# Patient Record
Sex: FEMALE | Race: WHITE | Employment: UNEMPLOYED | URBAN - METROPOLITAN AREA
[De-identification: names, ages, dates, MRNs, and addresses within clinical notes are randomized per-mention and may not be internally consistent; named-entity substitution may affect disease eponyms.]

---

## 2017-01-10 ENCOUNTER — OFFICE VISIT (OUTPATIENT)
Dept: INTERNAL MEDICINE CLINIC | Age: 52
End: 2017-01-10

## 2017-01-10 VITALS
DIASTOLIC BLOOD PRESSURE: 86 MMHG | TEMPERATURE: 97.7 F | WEIGHT: 162 LBS | HEIGHT: 60 IN | HEART RATE: 122 BPM | SYSTOLIC BLOOD PRESSURE: 122 MMHG | OXYGEN SATURATION: 97 % | BODY MASS INDEX: 31.8 KG/M2

## 2017-01-10 DIAGNOSIS — R35.0 URINARY FREQUENCY: Primary | ICD-10-CM

## 2017-01-10 DIAGNOSIS — R00.0 TACHYCARDIA: ICD-10-CM

## 2017-01-10 DIAGNOSIS — M54.5 ACUTE LEFT-SIDED LOW BACK PAIN, WITH SCIATICA PRESENCE UNSPECIFIED: ICD-10-CM

## 2017-01-10 DIAGNOSIS — R39.15 URGENCY OF URINATION: ICD-10-CM

## 2017-01-10 DIAGNOSIS — M25.552 LEFT HIP PAIN: ICD-10-CM

## 2017-01-10 DIAGNOSIS — F43.0 ACUTE STRESS REACTION: ICD-10-CM

## 2017-01-10 LAB
BILIRUB UR QL STRIP: NEGATIVE
GLUCOSE UR-MCNC: NEGATIVE MG/DL
KETONES P FAST UR STRIP-MCNC: NORMAL MG/DL
PH UR STRIP: 5.5 [PH] (ref 4.6–8)
PROT UR QL STRIP: NEGATIVE MG/DL
SP GR UR STRIP: 1.03 (ref 1–1.03)
UA UROBILINOGEN AMB POC: NORMAL (ref 0.2–1)
URINALYSIS CLARITY POC: CLEAR
URINALYSIS COLOR POC: YELLOW
URINE BLOOD POC: NEGATIVE
URINE LEUKOCYTES POC: NEGATIVE
URINE NITRITES POC: NEGATIVE

## 2017-01-10 NOTE — PROGRESS NOTES
HPI/History  Ashlee Martinez is a 46 y.o.  female who presents for evaluation. Pt reports urinary frequency and urgency starting last night. No dysuria, hematuria, fever, or vaginal sxs. No CVA tenderness but mild low back pain around left SI region and also left trochanteric region; no radiation. Hx of urinary stones but not similar per reports. Tachycardic today. Pt has not noted any palpitations and denies any other cardiopulmonary sxs. Pt significantly stressed and anxious due to a family member's recent health then passing today. She takes 0.5mg xanax infrequently as needed, 1 mg usually too strong per report. Last dose last night. Denies any other sxs, issues, or complaints. Patient Active Problem List   Diagnosis Code    Nephrolithiasis Dr. Nury Watkins N20.0    Depression F32.9    Hypovitaminosis D E55.9    Dyslipidemia E78.5    Hypothyroidism due to acquired atrophy of thyroid E03.4    Obesity (BMI 30-39. 9) E66.9    Colon adenoma 3/16 Dr Tawnya Guerrero D12.6     Past Medical History   Diagnosis Date    Anxiety     Colon adenoma 3/16     Dr Tawnya Guerrero    Depression     Dyslipidemia      intol numerous statins/tricor, FO ineffective    Fatty liver      negative serologies ; Fib-4 was 0.3 from 7/15    FHx: heart disease     GERD (gastroesophageal reflux disease)     Hypertension     Hypothyroidism     Hypovitaminosis D     Migraines      CT head negative     Nephrolithiasis     Neuropathy      on EMG Dr. Sandra Cerrato Obesity      peak weight 163 lbs, bmi 31.3 from 2/15    Seasonal allergies     Tension headache      Past Surgical History   Procedure Laterality Date    Hx total vaginal hysterectomy      Hx tonsillectomy      Vascular surgery procedure unlist       NADEGE 1.29/1.17    Hx  section       x4 due to cephalopelvic disproportion    Hx tubal ligation       bilateral    Pr colsc flx w/rmvl of tumor polyp lesion snare tq       Dr. Tawnya Guerrero 3/16 adenoma     Social History     Social History    Marital status:      Spouse name: N/A    Number of children: 3    Years of education: N/A     Occupational History    housewife      Social History Main Topics    Smoking status: Never Smoker    Smokeless tobacco: Never Used    Alcohol use No    Drug use: No    Sexual activity: Not on file     Other Topics Concern    Not on file     Social History Narrative     Family History   Problem Relation Age of Onset    Hypertension Mother     Diabetes Mother     Elevated Lipids Father     Colon Polyps Father     Hypertension Sister     Diabetes Maternal Grandmother     Stroke Maternal Grandmother     Heart Disease Paternal Grandmother     Hypertension Paternal Grandmother     Heart Disease Paternal Grandfather     Hypertension Paternal Grandfather      Current Outpatient Prescriptions   Medication Sig    colesevelam (WELCHOL) 625 mg tablet Take 3 Tabs by mouth two (2) times daily (with meals).  ALPRAZolam (XANAX) 1 mg tablet Take 1 Tab by mouth three (3) times daily as needed for Anxiety. Max Daily Amount: 3 mg.  chlorpheniramine-HYDROcodone (TUSSIONEX) 10-8 mg/5 mL suspension Take 5 mL by mouth every twelve (12) hours as needed for Cough. Max Daily Amount: 10 mL.  albuterol (PROVENTIL HFA, VENTOLIN HFA, PROAIR HFA) 90 mcg/actuation inhaler Take 2 Puffs by inhalation every six (6) hours as needed for Wheezing.  guaiFENesin-codeine (ROBITUSSIN AC) 100-10 mg/5 mL solution Take 5 mL by mouth three (3) times daily as needed for Cough. Max Daily Amount: 15 mL.  levothyroxine (SYNTHROID) 50 mcg tablet Take 1 Tab by mouth Daily (before breakfast).  citalopram (CELEXA) 20 mg tablet Take 1 Tab by mouth daily.  amLODIPine (NORVASC) 10 mg tablet Take 1 Tab by mouth daily.  gabapentin (NEURONTIN) 600 mg tablet Take 1 Tab by mouth three (3) times daily.     ergocalciferol (VITAMIN D2) 50,000 unit capsule Take 1 Cap by mouth every seven (7) days.  clotrimazole-betamethasone (LOTRISONE) topical cream Apply thin layer to affected area TWICE DAILY AS NEEDED    fluticasone (FLONASE) 50 mcg/actuation nasal spray 2 sprays each nostril daily    cyclobenzaprine (FLEXERIL) 10 mg tablet Take 1 Tab by mouth three (3) times daily as needed for Muscle Spasm(s).  omeprazole (PRILOSEC) 20 mg capsule Take 20 mg by mouth daily. No current facility-administered medications for this visit. Allergies   Allergen Reactions    Crestor [Rosuvastatin] Other (comments)     aches    Lipitor [Atorvastatin] Other (comments)     aches    Mobic [Meloxicam] Nausea Only and Other (comments)     headache    Niaspan [Niacin] Other (comments)     flushing    Percocet [Oxycodone-Acetaminophen] Other (comments)    Pravachol [Pravastatin] Other (comments)     aches    Tricor [Fenofibrate Micronized] Other (comments)     Elevated LFTs    Zetia [Ezetimibe] Other (comments)     gassiness    Zocor [Simvastatin] Other (comments)     aches       Review of Systems  Significant findings included in HPI. Physical Examination  Visit Vitals    /86    Pulse (!) 122    Temp 97.7 °F (36.5 °C) (Oral)    Ht 5' (1.524 m)    Wt 162 lb (73.5 kg)    SpO2 97%    BMI 31.64 kg/m2   HR recheck by palpation 119. Results for orders placed or performed in visit on 01/10/17   AMB POC URINALYSIS DIP STICK MANUAL W/O MICRO   Result Value Ref Range    Color (UA POC) Yellow     Clarity (UA POC) Clear     Glucose (UA POC) Negative Negative    Bilirubin (UA POC) Negative Negative    Ketones (UA POC) Trace Negative    Specific gravity (UA POC) 1.030 1.001 - 1.035    Blood (UA POC) Negative Negative    pH (UA POC) 5.5 4.6 - 8.0    Protein (UA POC) Negative Negative mg/dL    Urobilinogen (UA POC) 0.2 mg/dL 0.2 - 1    Nitrites (UA POC) Negative Negative    Leukocyte esterase (UA POC) Negative Negative     General - Alert and in no acute distress.  Pt appears well, comfortable, and in good spirits. Nontoxic. Very pleasant and engaging, not overly anxious appearing currently. Non-diaphoretic. Mental status - Appropriate mood, behavior, speech content, dress, and thought processes. Pulm - No tachypnea, retractions, or cyanosis. Good respiratory effort. Clear to auscultation bilat. Cardiovascular - Tachycardic, regular rhythm. No appreciable murmurs or gallops. Back - No CVA tenderness bilat. Minimal tenderness in the left SI region and left trochanteric region. No visible abnormalities. Normal ROM and ambulation. No other findings. Assessment and Plan  1. Urinary frequency and urgency since last night - UA negative. Discussed potential causes and contributing factors. Monitor for now but increase fluids and return if any developments. 2. Left SI and left trochanteric discomfort appears to be musculoskeletal in nature (with bursal involvement). Conservative measures and analgesics as needed and discussed at length along with course and prognosis. 3. Tachycardia today likely related to acute stress and anxiety from family member's recent health then passing today. No other cardiopulmonary sxs. She will continue xanax as needed and revisited adverse effects. She will return if any developments or visit ED if cardiopulmonary sxs. Pt happily agrees with plan. PLEASE NOTE:   This document has been produced using voice recognition software. Unrecognized errors in transcription may be present.     Pepper Delgado [facility]  [phone]  1/10/2017

## 2017-01-10 NOTE — MR AVS SNAPSHOT
Visit Information Date & Time Provider Department Dept. Phone Encounter #  
 1/10/2017  3:30 PM Ute Aguilar Internist of [address] [id number] Your Appointments 1/20/2017  8:10 AM  
LAB with [facility] NURSE VISIT Internist of [facility] ([facility]) Appt Note: lab  
 [address], [address] [facility] [address] [place]  
  
   
 [address], [address]  
  
    
 1/27/2017  9:00 AM  
Office Visit with Haleigh Smiley MD  
Internist of [address] [place] [address] [facility]: [address]; [address]  
 [address], [address] Maureen Limb [address] [place]  
  
   
 [address], [address] Upcoming Health Maintenance Date Due INFLUENZA AGE 9 TO ADULT 8/1/2016 COLONOSCOPY 3/16/2017 BREAST CANCER SCRN MAMMOGRAM 11/7/2018 DTaP/Tdap/Td series (2 - Td) 8/8/2022 Allergies as of 1/10/2017  Review Complete On: 11/7/2016 By: Shelley Bobo Severity Noted Reaction Type Reactions Crestor [Rosuvastatin]    Other (comments)  
 aches Lipitor [Atorvastatin]    Other (comments)  
 aches Mobic [Meloxicam]    Nausea Only, Other (comments)  
 headache Niaspan [Niacin]    Other (comments)  
 flushing Percocet [Oxycodone-acetaminophen]  09/13/2013    Other (comments) Pravachol [Pravastatin]    Other (comments)  
 aches Tricor [Fenofibrate Micronized]    Other (comments) Elevated LFTs Zetia [Ezetimibe]  09/13/2013    Other (comments)  
 gassiness Zocor [Simvastatin]    Other (comments)  
 aches Current Immunizations  Reviewed on 10/19/2012 Name Date Influenza Vaccine (Quad) PF 10/2/2015 10:37 AM  
 Influenza Vaccine PF 2/27/2015 Influenza Vaccine Split 10/19/2012, 10/21/2011  2:06 PM  
 Influenza Vaccine Whole 10/15/2010 TD Vaccine 4/15/2011 TDAP Vaccine 8/8/2012 Not reviewed this visit You Were Diagnosed With   
  
 Codes Comments Urgency of urination    -  Primary ICD-10-CM: R39.15 ICD-9-CM: 597.11 Abdominal pressure     ICD-10-CM: R10.9 ICD-9-CM: 789.00 Vitals BP Pulse Temp Height(growth percentile) Weight(growth percentile) SpO2  
 122/86 (!) 122 97.7 °F (36.5 °C) (Oral) 5' (1.524 m) 162 lb (73.5 kg) 97% BMI OB Status Smoking Status 31.64 kg/m2 Hysterectomy Never Smoker Vitals History BMI and BSA Data Body Mass Index Body Surface Area  
 31.64 kg/m 2 1.76 m 2 Preferred Pharmacy Pharmacy Name Phone Ira[facility] Armendariz, [facility] Your Updated Medication List  
  
   
This list is accurate as of: 1/10/17  3:30 PM.  Always use your most recent med list.  
  
  
  
  
 albuterol 90 mcg/actuation inhaler Commonly known as:  PROVENTIL HFA, VENTOLIN HFA, PROAIR HFA Take 2 Puffs by inhalation every six (6) hours as needed for Wheezing. ALPRAZolam 1 mg tablet Commonly known as:  Reno Les Take 1 Tab by mouth three (3) times daily as needed for Anxiety. Max Daily Amount: 3 mg. amLODIPine 10 mg tablet Commonly known as:  Oneal Bashir Take 1 Tab by mouth daily. chlorpheniramine-HYDROcodone 10-8 mg/5 mL suspension Commonly known as:  Judy Elias Take 5 mL by mouth every twelve (12) hours as needed for Cough. Max Daily Amount: 10 mL. citalopram 20 mg tablet Commonly known as:  Ilya Diamante Take 1 Tab by mouth daily. clotrimazole-betamethasone topical cream  
Commonly known as:  Wilver Drones Apply thin layer to affected area TWICE DAILY AS NEEDED  
  
 colesevelam 625 mg tablet Commonly known as:  [facility] Take 3 Tabs by mouth two (2) times daily (with meals). cyclobenzaprine 10 mg tablet Commonly known as:  FLEXERIL Take 1 Tab by mouth three (3) times daily as needed for Muscle Spasm(s). ergocalciferol 50,000 unit capsule Commonly known as:  VITAMIN D2 Take 1 Cap by mouth every seven (7) days. fluticasone 50 mcg/actuation nasal spray Commonly known as:  FLONASE  
2 sprays each nostril daily  
  
 gabapentin 600 mg tablet Commonly known as:  NEURONTIN Take 1 Tab by mouth three (3) times daily. guaiFENesin-codeine 100-10 mg/5 mL solution Commonly known as:  ROBITUSSIN AC Take 5 mL by mouth three (3) times daily as needed for Cough. Max Daily Amount: 15 mL. levothyroxine 50 mcg tablet Commonly known as:  SYNTHROID Take 1 Tab by mouth Daily (before breakfast). PriLOSEC 20 mg capsule Generic drug:  omeprazole Take 20 mg by mouth daily. We Performed the Following AMB POC URINALYSIS DIP STICK MANUAL W/O MICRO [50734 CPT(R)] Introducing hospitals & [facility] SERVICES! Dear Ld Albrecht: Thank you for requesting a [employer] account. Our records indicate that you already have an active [employer] account. You can access your account anytime at [email or web address] Did you know that you can access your hospital and ER discharge instructions at any time in [employer]? You can also review all of your test results from your hospital stay or ER visit. Additional Information If you have questions, please visit the Frequently Asked Questions section of the [employer] website at [email or web address] Remember, [employer] is NOT to be used for urgent needs. For medical emergencies, dial 911. Now available from your iPhone and Android! Please provide this summary of care documentation to your next provider. Your primary care clinician is listed as Alexandra Bill. If you have any questions after today's visit, please call [phone].

## 2017-01-20 ENCOUNTER — HOSPITAL ENCOUNTER (OUTPATIENT)
Dept: LAB | Age: 52
Discharge: HOME | End: 2017-01-20

## 2017-01-20 PROCEDURE — 99001 SPECIMEN HANDLING PT-LAB: CPT | Performed by: INTERNAL MEDICINE

## 2017-01-22 DIAGNOSIS — E78.5 DYSLIPIDEMIA: ICD-10-CM

## 2017-01-24 NOTE — PROGRESS NOTES
46 y.o. white female who presents for evaluation. Non cardiovascular complaints. She could exercise more.   Weight is stable as below     Vitals 2017 1/10/2017 2016 3/16/2016 3/16/2016 3/16/2016   Weight 159 lb 162 lb 158 lb        Vitals 3/16/2016 3/16/2016 3/11/2016 2016 2016   Weight   160 lb 162 lb 161 lb     Remains on the welchol but she stopped the mevacor due to muscle issues    No gi or gu complaints    Past Medical History   Diagnosis Date    Anxiety     Colon adenoma 3/16     Dr Khadra Cedeño    Depression     Dyslipidemia      intol 5 statins/tricor    Fatty liver      negative serologies ; Fib-4 was 0.3 from 7/15    FHx: heart disease     GERD (gastroesophageal reflux disease)     Hypertension     Hypothyroidism     Hypovitaminosis D     Migraines      CT head negative     Nephrolithiasis     Neuropathy 2006     on EMG Dr. Buddy Henley Obesity      peak weight 163 lbs, bmi 31.3 from 2/15; dec johnathan suppressants, med supervised wt loss    Seasonal allergies     Tension headache      Past Surgical History   Procedure Laterality Date    Hx total vaginal hysterectomy      Hx tonsillectomy      Vascular surgery procedure unlist       NADEGE 1.29/1.17    Hx  section       x4 due to cephalopelvic disproportion    Hx tubal ligation       bilateral    Pr colsc flx w/rmvl of tumor polyp lesion snare tq       Dr. Khadra Cedeño 3/16 adenoma     Social History     Social History    Marital status:      Spouse name: N/A    Number of children: 4    Years of education: N/A     Occupational History    housewife      Social History Main Topics    Smoking status: Never Smoker    Smokeless tobacco: Never Used    Alcohol use No    Drug use: No    Sexual activity: Not on file     Other Topics Concern    Not on file     Social History Narrative     Current Outpatient Prescriptions   Medication Sig    colesevelam (WELCHOL) 625 mg tablet Take 3 Tabs by mouth two (2) times daily (with meals).  ALPRAZolam (XANAX) 1 mg tablet Take 1 Tab by mouth three (3) times daily as needed for Anxiety. Max Daily Amount: 3 mg.  albuterol (PROVENTIL HFA, VENTOLIN HFA, PROAIR HFA) 90 mcg/actuation inhaler Take 2 Puffs by inhalation every six (6) hours as needed for Wheezing.  levothyroxine (SYNTHROID) 50 mcg tablet Take 1 Tab by mouth Daily (before breakfast).  citalopram (CELEXA) 20 mg tablet Take 1 Tab by mouth daily.  amLODIPine (NORVASC) 10 mg tablet Take 1 Tab by mouth daily.  gabapentin (NEURONTIN) 600 mg tablet Take 1 Tab by mouth three (3) times daily.  clotrimazole-betamethasone (LOTRISONE) topical cream Apply thin layer to affected area TWICE DAILY AS NEEDED    fluticasone (FLONASE) 50 mcg/actuation nasal spray 2 sprays each nostril daily    cyclobenzaprine (FLEXERIL) 10 mg tablet Take 1 Tab by mouth three (3) times daily as needed for Muscle Spasm(s).  omeprazole (PRILOSEC) 20 mg capsule Take 20 mg by mouth daily.  ergocalciferol (VITAMIN D2) 50,000 unit capsule Take 1 Cap by mouth every seven (7) days. No current facility-administered medications for this visit.       Allergies   Allergen Reactions    Crestor [Rosuvastatin] Myalgia    Lipitor [Atorvastatin] Myalgia    Mevacor [Lovastatin] Myalgia    Mobic [Meloxicam] Nausea Only and Other (comments)     headache    Niaspan [Niacin] Other (comments)     flushing    Percocet [Oxycodone-Acetaminophen] Other (comments)    Pravachol [Pravastatin] Myalgia    Tricor [Fenofibrate Micronized] Other (comments)     Elevated LFTs    Zetia [Ezetimibe] Other (comments)     gassiness    Zocor [Simvastatin] Myalgia     REVIEW OF SYSTEMS: mammo 10/15, Dr Frost Eth 9/15, colo 3/16 Dr Jonah Joe   Ophtho - no vision change or eye pain  Oral - no mouth pain, tongue or tooth problems  Ears - no hearing loss, ear pain, fullness, no swallowing problems  Cardiac - no CP, PND, orthopnea, edema, palpitations or syncope  Chest - no breast masses  Resp - no wheezing, chronic coughing, dyspnea  GI - no heartburn, nausea, vomiting, change in bowel habits, bleeding, hemorrhoids  Urinary - no dysuria, hematuria, flank pain, urgency, frequency  Neuro - no focal weakness, numbness, paresthesias, incoordination, ataxia, involuntary movements  Endo - no polyuria, polydipsia, nocturia, hot flashes    Visit Vitals    /80    Pulse 89    Temp 98.3 °F (36.8 °C) (Oral)    Ht 5' (1.524 m)    Wt 159 lb (72.1 kg)    SpO2 96%    BMI 31.05 kg/m2   A&O x3  Affect is appropriate. Mood stable  No apparent distress  Anicteric, no JVD, adenopathy or thyromegaly. No carotid bruits or radiated murmur  Lungs showed crackles at the left base, dec bs but no clear wheezing  Heart showed regular rate and rhythm. No murmur, rubs, gallops  Abdomen soft nontender, no hepatosplenomegaly or masses. Extremities without edema.   Pulses 1-2+ symmetrically    LABS  From 10/11 showed ldl-p 2955  chol 199, tg 390, hdl 39, ldl-c 82  From  2/12  showed ldl-p 2029, chol 223, tg 244, hdl 38, ldl-c 136,  gluc 90, cr 0.70,      alt 33  From  6/12  showed ldl-p 2427, chol 221, tg 196, hdl 45, ldl-c 137  From 10/12 showed ldl-p 2056, chol 193, tg 261, hdl 37, ldl-c 104,       tsh 1.56  From 2/13 showed       chol 164, tg 190, hdl 44, ldl-c 113  From 9/13 showed       chol 201, tg 195, hdl 38, ldl-c 124, gluc 89, cr 0.70, gfr 104, alt 18, tsh 1.48,     vit d 35.1  From 3/14 showed       chol 213, tg 269, hdl 40, ldl-c 119, gluc 96, cr 0.63, gfr>60,  alt 17  From 7/14 showed       chol 216, tg 237, hdl 38, ldl-c 131, gluc 87, cr 0.60, gfr>60,  alt 10, tsh 1.29  From 1/15 showed       chol 210, tg 305, hdl 40, ldl-c 109, gluc 89, cr 0.62, gfr>60,  alt 11  From 7/15 showed       chol 209, tg 231, hdl 45, ldl-c 118, gluc 89, cr 0.70, gfr>60,  alt 15, tsh 3.23, wbc 6.7, hb 14.6, plt 294  From 1/16 showed       chol 212, tg 314, hdl 40, ldl-c 109, hep c neg  From 7/16 showed       chol 218, tg 253, hdl 37, ldl-c 130, gluc 88, cr 0.61, gfr 106, alt 27, tsh 2.71  From 1/17 showed       chol 218, tg 229, hdl 46, ldl-c 126,            wbc 6.8, hb 16.5, plt 321, vit d 21.1    Patient Active Problem List   Diagnosis Code    Nephrolithiasis Dr. Sangita Frank N20.0    Depression F32.9    Hypovitaminosis D E55.9    Dyslipidemia E78.5    Hypothyroidism due to acquired atrophy of thyroid E03.4    Obesity (BMI 30-39. 9) E66.9    Colon adenoma 3/16 Dr Senaida Kawasaki D12.6     Assessment and plan:  1. HLP. Intol 5 statins. Not much response to the welchol upon review of numbers above. Discussed pitavastain and new pcsk9 although doubt she'll be qualified for the latter. We decided to stop the welchol and reeval in 6 mos  2. Hypovit d. Restart supplementation  3. HTN. Continue current  4. Hypothyroidism. Therapeutic   5. Overweight/obese. Dietary and lifestyle measures, calorie counting. Previously not interested in johnathan suppressants. We discussed lack of data and success w otc johnathan supp. DIscussed incorporating more toning into her regime  6. Anxiety. Prn xanax   7. Colon adenoma. Fiber, colo 2021          RTC 7/17      Above conditions discussed at length and patient vocalized understanding.   All questions answered to patient satifaction

## 2017-01-27 ENCOUNTER — OFFICE VISIT (OUTPATIENT)
Dept: INTERNAL MEDICINE CLINIC | Age: 52
End: 2017-01-27

## 2017-01-27 VITALS
SYSTOLIC BLOOD PRESSURE: 120 MMHG | HEIGHT: 60 IN | WEIGHT: 159 LBS | HEART RATE: 89 BPM | DIASTOLIC BLOOD PRESSURE: 80 MMHG | OXYGEN SATURATION: 96 % | TEMPERATURE: 98.3 F | BODY MASS INDEX: 31.22 KG/M2

## 2017-01-27 DIAGNOSIS — E55.9 HYPOVITAMINOSIS D: ICD-10-CM

## 2017-01-27 DIAGNOSIS — E03.4 HYPOTHYROIDISM DUE TO ACQUIRED ATROPHY OF THYROID: Primary | ICD-10-CM

## 2017-01-27 DIAGNOSIS — E78.5 DYSLIPIDEMIA: ICD-10-CM

## 2017-01-27 DIAGNOSIS — E66.9 OBESITY (BMI 30-39.9): ICD-10-CM

## 2017-01-27 RX ORDER — ERGOCALCIFEROL 1.25 MG/1
50000 CAPSULE ORAL
Qty: 13 CAP | Refills: 3 | Status: SHIPPED | OUTPATIENT
Start: 2017-01-27 | End: 2018-01-08 | Stop reason: SDUPTHER

## 2017-01-27 NOTE — PROGRESS NOTES
1. Have you been to the ER, urgent care clinic or hospitalized since your last visit? NO.     2. Have you seen or consulted any other health care providers outside of the [address] [place] [address] since your last visit (Include any pap smears or colon screening)? NO      Do you have an Advanced Directive? NO    Would you like information on Advanced Directives?  YES

## 2017-01-27 NOTE — MR AVS SNAPSHOT
Visit Information Date & Time Provider Department Dept. Phone Encounter #  
 1/27/2017  9:00 AM Christ Irizarry MD Internist of [place] [place] [phone] Your Appointments 7/13/2017  9:15 AM  
LAB with [facility] NURSE VISIT Internist of [facility] Appt Note: labs 6mos. rd  
 [address], [address] [facility] [address]  
  
   
 [address], [address]  
  
    
 7/27/2017  8:15 AM  
Office Visit with Christ Irizarry MD  
Internist of [facility]) Appt Note: ov 6mos. rd  
 [address], [address] [facility] ([place], [place]) [address]  
  
   
 [address], [address] Upcoming Health Maintenance Date Due  
 BREAST CANCER SCRN MAMMOGRAM 11/7/2018 COLONOSCOPY 3/16/2021 DTaP/Tdap/Td series (2 - Td) 8/8/2022 Allergies as of 1/27/2017  Review Complete On: 1/27/2017 By: Christ Irizarry MD  
  
 Severity Noted Reaction Type Reactions Crestor [Rosuvastatin]    Myalgia Lipitor [Atorvastatin]    Myalgia Mevacor [Lovastatin]  01/27/2017    Myalgia Mobic [Meloxicam]    Nausea Only, Other (comments)  
 headache Niaspan [Niacin]    Other (comments)  
 flushing Percocet [Oxycodone-acetaminophen]  09/13/2013    Other (comments) Pravachol [Pravastatin]    Myalgia Tricor [Fenofibrate Micronized]    Other (comments) Elevated LFTs Zetia [Ezetimibe]  09/13/2013    Other (comments)  
 gassiness Zocor [Simvastatin]    Myalgia Current Immunizations  Reviewed on 10/19/2012 Name Date Influenza Vaccine (Quad) PF 10/2/2015 10:37 AM  
 Influenza Vaccine PF 2/27/2015 Influenza Vaccine Split 10/19/2012, 10/21/2011  2:06 PM  
 Influenza Vaccine Whole 10/15/2010 TD Vaccine 4/15/2011 TDAP Vaccine 8/8/2012 Not reviewed this visit Vitals BP Pulse Temp Height(growth percentile) Weight(growth percentile) SpO2  
 120/80 89 98.3 °F (36.8 °C) (Oral) 5' (1.524 m) 159 lb (72.1 kg) 96% BMI OB Status Smoking Status 31.05 kg/m2 Hysterectomy Never Smoker Vitals History BMI and BSA Data Body Mass Index Body Surface Area 31.05 kg/m 2 1.75 m 2 Preferred Pharmacy Pharmacy Name Phone [facility] Your Updated Medication List  
  
   
This list is accurate as of: 1/27/17  9:32 AM.  Always use your most recent med list.  
  
  
  
  
 albuterol 90 mcg/actuation inhaler Commonly known as:  PROVENTIL HFA, VENTOLIN HFA, PROAIR HFA Take 2 Puffs by inhalation every six (6) hours as needed for Wheezing. ALPRAZolam 1 mg tablet Commonly known as:  [place] Amend Take 1 Tab by mouth three (3) times daily as needed for Anxiety. Max Daily Amount: 3 mg. amLODIPine 10 mg tablet Commonly known as:  Brandy Croon Take 1 Tab by mouth daily. citalopram 20 mg tablet Commonly known as:  Ej Howellh Take 1 Tab by mouth daily. clotrimazole-betamethasone topical cream  
Commonly known as:  Creolayaakov Remy Apply thin layer to affected area TWICE DAILY AS NEEDED  
  
 colesevelam 625 mg tablet Commonly known as:  [facility] Take 3 Tabs by mouth two (2) times daily (with meals). cyclobenzaprine 10 mg tablet Commonly known as:  FLEXERIL Take 1 Tab by mouth three (3) times daily as needed for Muscle Spasm(s). ergocalciferol 50,000 unit capsule Commonly known as:  VITAMIN D2 Take 1 Cap by mouth every seven (7) days. fluticasone 50 mcg/actuation nasal spray Commonly known as:  FLONASE  
2 sprays each nostril daily  
  
 gabapentin 600 mg tablet Commonly known as:  NEURONTIN Take 1 Tab by mouth three (3) times daily. levothyroxine 50 mcg tablet Commonly known as:  SYNTHROID Take 1 Tab by mouth Daily (before breakfast). PriLOSEC 20 mg capsule Generic drug:  omeprazole Take 20 mg by mouth daily. Introducing [facility] SERVICES! Dear Nikunj Slater: Thank you for requesting a [employer] account. Our records indicate that you already have an active [employer] account. You can access your account anytime at [email or web address]. [email or web address] Did you know that you can access your hospital and ER discharge instructions at any time in [employer]? You can also review all of your test results from your hospital stay or ER visit. Additional Information If you have questions, please visit the Frequently Asked Questions section of the [employer] website at [email or web address] Remember, [employer] is NOT to be used for urgent needs. For medical emergencies, dial 911. Now available from your iPhone and Android! Please provide this summary of care documentation to your next provider. Your primary care clinician is listed as Leila Meredith. If you have any questions after today's visit, please call [phone].

## 2017-01-30 ENCOUNTER — TELEPHONE (OUTPATIENT)
Dept: INTERNAL MEDICINE CLINIC | Age: 52
End: 2017-01-30

## 2017-01-30 NOTE — TELEPHONE ENCOUNTER
Pt calling, says she got rx for Vitamin D. Says she thought she was told to take every other week but bottle when she picked it up says weekly. What is correct dosage?

## 2017-02-08 RX ORDER — AMLODIPINE BESYLATE 10 MG/1
10 TABLET ORAL DAILY
Qty: 30 TAB | Refills: 11 | Status: SHIPPED | OUTPATIENT
Start: 2017-02-08 | End: 2017-04-04 | Stop reason: SDUPTHER

## 2017-03-02 RX ORDER — GABAPENTIN 600 MG/1
600 TABLET ORAL 3 TIMES DAILY
Qty: 90 TAB | Refills: 11 | Status: SHIPPED | OUTPATIENT
Start: 2017-03-02 | End: 2018-03-23 | Stop reason: SDUPTHER

## 2017-03-28 RX ORDER — CITALOPRAM 20 MG/1
20 TABLET, FILM COATED ORAL DAILY
Qty: 90 TAB | Refills: 3 | Status: SHIPPED | OUTPATIENT
Start: 2017-03-28 | End: 2018-03-21 | Stop reason: SDUPTHER

## 2017-04-04 ENCOUNTER — TELEPHONE (OUTPATIENT)
Dept: INTERNAL MEDICINE CLINIC | Age: 52
End: 2017-04-04

## 2017-04-04 RX ORDER — AMLODIPINE BESYLATE 10 MG/1
10 TABLET ORAL DAILY
Qty: 30 TAB | Refills: 11 | Status: SHIPPED | OUTPATIENT
Start: 2017-04-04 | End: 2018-03-04 | Stop reason: SDUPTHER

## 2017-04-04 RX ORDER — LEVOTHYROXINE SODIUM 50 UG/1
TABLET ORAL
Qty: 30 TAB | Refills: 0 | Status: SHIPPED | OUTPATIENT
Start: 2017-04-04 | End: 2017-04-12 | Stop reason: SDUPTHER

## 2017-04-04 RX ORDER — LEVOTHYROXINE SODIUM 50 UG/1
50 TABLET ORAL
Qty: 30 TAB | Refills: 11 | Status: SHIPPED | OUTPATIENT
Start: 2017-04-04 | End: 2018-04-03 | Stop reason: SDUPTHER

## 2017-04-04 NOTE — TELEPHONE ENCOUNTER
[facility]. Got 2 requests for Synthroid wants to know which is correct? With or without refills?

## 2017-04-12 ENCOUNTER — TELEPHONE (OUTPATIENT)
Dept: INTERNAL MEDICINE CLINIC | Age: 52
End: 2017-04-12

## 2017-04-12 NOTE — PROGRESS NOTES
Colon Screen    Patient was contacted by phone for the documentation reflected in this encounter    Patient: Murphy Jackson MRN: [id number]  SSN: [id number]    YOB: 1965  Age: 46 y.o. Sex: female        Subjective:   Murphy Jackson wasreferred due to colonoscopy screening follow up recommendation. PCP is Suleiman Baxter MD.  Patient referred for colonoscopy for   Personal history of colon polyps (screening only), Screening colonoscopy. Patient denies abdominal pain,rectal pain or bleeding. Abdominal surgeries as described below, specifically hysterectomy  Family history as described below, specifically none for colon cancer father hx polyps. Last colonoscopy was 1 year ago with Dr. Yuan Gruber personal hx tubular adenoma. Is patient currently on Oxygen: no  Is patient taking blood thinners, fluid pills,or medication for diabetes? no       Does the patient have a history of any condition listed below? Cardiac, pulmonary or hepatic disease? no  Severe coronary artery disease or aortic stenosis? no  Throat cancer? no  Heart transplant? no  Endocarditis? no  Heart valve replacement? no  Congestive heart failure?  no        Allergies   Allergen Reactions    Crestor [Rosuvastatin] Myalgia    Lipitor [Atorvastatin] Myalgia    Mevacor [Lovastatin] Myalgia    Mobic [Meloxicam] Nausea Only and Other (comments)     headache    Niaspan [Niacin] Other (comments)     flushing    Percocet [Oxycodone-Acetaminophen] Other (comments)    Pravachol [Pravastatin] Myalgia    Tricor [Fenofibrate Micronized] Other (comments)     Elevated LFTs    Zetia [Ezetimibe] Other (comments)     gassiness    Zocor [Simvastatin] Myalgia       Past Medical History:   Diagnosis Date    Anxiety     Colon adenoma 3/16    Dr Yuan Gruber    Depression     Dyslipidemia     intol 5 statins/tricor    Fatty liver     negative serologies 2004; Fib-4 was 0.3 from 7/15    FHx: heart disease     GERD (gastroesophageal reflux disease)     Hypertension     Hypothyroidism     Hypovitaminosis D     Migraines     CT head negative     Nephrolithiasis     Neuropathy 2006    on [facility] Dr. Pedro Nicholson Obesity     peak weight 163 lbs, bmi 31.3 from 2/15; dec johnathan suppressants, med supervised wt loss    Seasonal allergies     Tension headache      Past Surgical History:   Procedure Laterality Date    HX  SECTION      x4 due to cephalopelvic disproportion    HX ORTHOPAEDIC      left foot    HX TONSILLECTOMY      HX TOTAL VAGINAL HYSTERECTOMY      HX TUBAL LIGATION      bilateral    OH COLSC FLX W/RMVL OF TUMOR Roslynn Pond Dr. Santiago Favre 3/16 adenoma    VASCULAR SURGERY PROCEDURE UNLIST      NADEGE 1.29/1.17      Family History   Problem Relation Age of Onset    Hypertension Mother     Diabetes Mother    [de-identified] Elevated Lipids Father     Colon Polyps Father     Hypertension Sister     Diabetes Maternal Grandmother     Stroke Maternal Grandmother     Heart Disease Paternal Grandmother     Hypertension Paternal Grandmother     Heart Disease Paternal Grandfather     Hypertension Paternal Grandfather      Social History   Substance Use Topics    Smoking status: Never Smoker    Smokeless tobacco: Never Used    Alcohol use No      Prior to Admission medications    Medication Sig Start Date End Date Taking? Authorizing Provider   levothyroxine (SYNTHROID) 50 mcg tablet Take 1 Tab by mouth Daily (before breakfast). 17  Yes Malina Jaimes MD   amLODIPine (NORVASC) 10 mg tablet Take 1 Tab by mouth daily. 17  Yes Malina Jaimes MD   citalopram (CELEXA) 20 mg tablet Take 1 Tab by mouth daily. 3/28/17  Yes Malina Jaimes MD   gabapentin (NEURONTIN) 600 mg tablet Take 1 Tab by mouth three (3) times daily. 3/2/17  Yes Malina Jaimes MD   ergocalciferol (VITAMIN D2) 50,000 unit capsule Take 1 Cap by mouth every seven (7) days.  17  Yes Malina Jaimes MD   ALPRAZolam [place] ) 1 mg tablet Take 1 Tab by mouth three (3) times daily as needed for Anxiety. Max Daily Amount: 3 mg. 8/8/16  Yes Edin Gamez MD   clotrimazole-betamethasone (LOTRISONE) topical cream Apply thin layer to affected area TWICE DAILY AS NEEDED 9/11/15  Yes Edin Gamez MD   fluticasone (FLONASE) 50 mcg/actuation nasal spray 2 sprays each nostril daily 8/19/15  Yes Edin Gamez MD   cyclobenzaprine (FLEXERIL) 10 mg tablet Take 1 Tab by mouth three (3) times daily as needed for Muscle Spasm(s). 2/27/15  Yes Edin Gamez MD   omeprazole (PRILOSEC) 20 mg capsule Take 20 mg by mouth daily. Yes Historical Provider   albuterol (PROVENTIL HFA, VENTOLIN HFA, PROAIR HFA) 90 mcg/actuation inhaler Take 2 Puffs by inhalation every six (6) hours as needed for Wheezing. 7/25/16   Edin Gamez MD          Review of Systems   Constitutional: Negative. HENT: Negative for congestion, ear discharge, ear pain, hearing loss, nosebleeds, sore throat and tinnitus. Eyes: Negative. Respiratory: Negative. Negative for stridor. Gastrointestinal: Positive for heartburn. Negative for abdominal pain, blood in stool, constipation, diarrhea, melena, nausea and vomiting. Genitourinary: Negative. Musculoskeletal: Negative. Skin: Positive for itching and rash. Neurological: Positive for headaches. Negative for dizziness, tingling, tremors, sensory change, speech change, focal weakness, seizures and loss of consciousness. Endo/Heme/Allergies: Negative. Psychiatric/Behavioral: Positive for depression. Negative for hallucinations, memory loss, substance abuse and suicidal ideas. The patient does not have insomnia.             Risks colonoscopy described- colon injury, missed lesion, anesthesia problems, bleeding       Cornelio Chairez RN  April 12, 2017  2:04 PM

## 2017-04-12 NOTE — TELEPHONE ENCOUNTER
Patient has been taking Vit D weekly and she has run out but the pharmacy wont refill till the 27th April is it ok to miss it until then. Please review and advise.  [phone]

## 2017-05-10 DIAGNOSIS — F41.9 ANXIETY: ICD-10-CM

## 2017-05-10 RX ORDER — ALPRAZOLAM 1 MG/1
1 TABLET ORAL
Qty: 40 TAB | Refills: 1 | OUTPATIENT
Start: 2017-05-10 | End: 2019-01-31 | Stop reason: SDUPTHER

## 2017-07-13 ENCOUNTER — HOSPITAL ENCOUNTER (OUTPATIENT)
Dept: LAB | Age: 52
Discharge: HOME | End: 2017-07-13

## 2017-07-13 PROCEDURE — 99001 SPECIMEN HANDLING PT-LAB: CPT | Performed by: INTERNAL MEDICINE

## 2017-07-14 LAB
25(OH)D3+25(OH)D2 SERPL-MCNC: 40.2 NG/ML (ref 30–100)
CHOLEST SERPL-MCNC: 242 MG/DL (ref 100–199)
HDLC SERPL-MCNC: 45 MG/DL
INTERPRETATION, 910389: NORMAL
LDLC SERPL CALC-MCNC: 160 MG/DL (ref 0–99)
TRIGL SERPL-MCNC: 184 MG/DL (ref 0–149)
TSH SERPL DL<=0.005 MIU/L-ACNC: 2.04 UIU/ML (ref 0.45–4.5)
VLDLC SERPL CALC-MCNC: 37 MG/DL (ref 5–40)

## 2017-07-23 NOTE — PROGRESS NOTES
46 y.o. white female who presents for evaluation. Non cardiovascular complaints. She could exercise more, sticking to the diet for the most part. Weight is stable as below     Vitals 2017 2017 1/10/2017 2016   Weight 161 lb 6.4 oz 159 lb 162 lb 158 lb     She came off the welchol after the last visit    No gu complaints. She has been having postprandial gi upset from some weeks now, no actual pain, nausea, she does have looser stools and is concerned about her gb    She did something to her right medial knee. Started after she instituted a walking regimen, intermittently swells.  Not using anything for it on regular basis    Past Medical History:   Diagnosis Date    Anxiety     Colon adenoma 3/16    Dr Karen Grigsby    Depression     Dyslipidemia     intol 5 statins/tricor    Fatty liver     negative serologies ; Fib-4 was 0.3 from 7/15    FHx: heart disease     GERD (gastroesophageal reflux disease)     Hypertension     Hypothyroidism     Hypovitaminosis D     Migraines     CT head negative     Nephrolithiasis     Neuropathy ([address]     on [facility] Dr. Silverio Torres Obesity     peak weight 163 lbs, bmi 31.3 from 2/15; dec johnathan suppressants, med supervised wt loss    Seasonal allergies     Tension headache      Past Surgical History:   Procedure Laterality Date    HX  SECTION      x4 due to cephalopelvic disproportion    HX ORTHOPAEDIC      left foot    HX TONSILLECTOMY      HX TOTAL VAGINAL HYSTERECTOMY      HX TUBAL LIGATION      bilateral    ID COLSC FLX W/RMVL OF TUMOR Rob Linear      Dr. Karen Grigsby 3/16 adenoma    VASCULAR SURGERY PROCEDURE UNLIST      NADEGE 1.29/1.17     Social History     Social History    Marital status:      Spouse name: N/A    Number of children: 4    Years of education: N/A     Occupational History    housewife      Social History Main Topics    Smoking status: Never Smoker    Smokeless tobacco: Never Used    Alcohol use No    Drug use: No    Sexual activity: Not on file     Other Topics Concern    Not on file     Social History Narrative     Current Outpatient Prescriptions   Medication Sig    ALPRAZolam (XANAX) 1 mg tablet Take 1 Tab by mouth three (3) times daily as needed for Anxiety. Max Daily Amount: 3 mg.  levothyroxine (SYNTHROID) 50 mcg tablet Take 1 Tab by mouth Daily (before breakfast).  amLODIPine (NORVASC) 10 mg tablet Take 1 Tab by mouth daily.  citalopram (CELEXA) 20 mg tablet Take 1 Tab by mouth daily.  gabapentin (NEURONTIN) 600 mg tablet Take 1 Tab by mouth three (3) times daily.  ergocalciferol (VITAMIN D2) 50,000 unit capsule Take 1 Cap by mouth every seven (7) days.  albuterol (PROVENTIL HFA, VENTOLIN HFA, PROAIR HFA) 90 mcg/actuation inhaler Take 2 Puffs by inhalation every six (6) hours as needed for Wheezing.  clotrimazole-betamethasone (LOTRISONE) topical cream Apply thin layer to affected area TWICE DAILY AS NEEDED    fluticasone (FLONASE) 50 mcg/actuation nasal spray 2 sprays each nostril daily    cyclobenzaprine (FLEXERIL) 10 mg tablet Take 1 Tab by mouth three (3) times daily as needed for Muscle Spasm(s).  omeprazole (PRILOSEC) 20 mg capsule Take 20 mg by mouth daily. No current facility-administered medications for this visit.       Allergies   Allergen Reactions    Crestor [Rosuvastatin] Myalgia    Lipitor [Atorvastatin] Myalgia    Mevacor [Lovastatin] Myalgia    Mobic [Meloxicam] Nausea Only and Other (comments)     headache    Niaspan [Niacin] Other (comments)     flushing    Percocet [Oxycodone-Acetaminophen] Other (comments)    Pravachol [Pravastatin] Myalgia    Tricor [Fenofibrate Micronized] Other (comments)     Elevated LFTs    Zetia [Ezetimibe] Other (comments)     gassiness    Zocor [Simvastatin] Myalgia     REVIEW OF SYSTEMS: mammo 10/16, Dr Lenny Solorio 9/15, colo 3/16 Dr An Frey   Ophtho - no vision change or eye pain  Oral - no mouth pain, tongue or tooth problems  Ears - no hearing loss, ear pain, fullness, no swallowing problems  Cardiac - no CP, PND, orthopnea, edema, palpitations or syncope  Chest - no breast masses  Resp - no wheezing, chronic coughing, dyspnea  GI - no heartburn, nausea, vomiting, change in bowel habits, bleeding, hemorrhoids  Urinary - no dysuria, hematuria, flank pain, urgency, frequency  Neuro - no focal weakness, numbness, paresthesias, incoordination, ataxia, involuntary movements  Endo - no polyuria, polydipsia, nocturia, hot flashes    Visit Vitals    /70 (BP 1 Location: Left arm, BP Patient Position: Sitting)    Pulse 76    Temp 98.8 °F (37.1 °C) (Oral)    Resp 16    Ht 5' (1.524 m)    Wt 161 lb 6.4 oz (73.2 kg)    SpO2 99%    BMI 31.52 kg/m2   A&O x3  Affect is appropriate. Mood stable  No apparent distress  Anicteric, no JVD, adenopathy or thyromegaly. No carotid bruits or radiated murmur  Lungs showed crackles at the left base, dec bs but no clear wheezing  Heart showed regular rate and rhythm. No murmur, rubs, gallops  Abdomen soft nontender, no hepatosplenomegaly or masses. Extremities without edema.   Pulses 1-2+ symmetrically    LABS  From 10/11 showed ldl-p 2955  chol 199, tg 390, hdl 39, ldl-c 82  From  2/12  showed ldl-p 2029, chol 223, tg 244, hdl 38, ldl-c 136,  gluc 90, cr 0.70,      alt 33  From  6/12  showed ldl-p 2427, chol 221, tg 196, hdl 45, ldl-c 137  From 10/12 showed ldl-p 2056, chol 193, tg 261, hdl 37, ldl-c 104,       tsh 1.56  From 2/13 showed       chol 164, tg 190, hdl 44, ldl-c 113  From 9/13 showed       chol 201, tg 195, hdl 38, ldl-c 124, gluc 89, cr 0.70, gfr 104, alt 18, tsh 1.48,     vit d 35.1  From 3/14 showed       chol 213, tg 269, hdl 40, ldl-c 119, gluc 96, cr 0.63, gfr>60,  alt 17  From 7/14 showed       chol 216, tg 237, hdl 38, ldl-c 131, gluc 87, cr 0.60, gfr>60,  alt 10, tsh 1.29  From 1/15 showed       chol 210, tg 305, hdl 40, ldl-c 109, gluc 89, cr 0.62, gfr>60,  alt 11  From 7/15 showed       chol 209, tg 231, hdl 45, ldl-c 118, gluc 89, cr 0.70, gfr>60,  alt 15, tsh 3.23, wbc 6.7, hb 14.6, plt 294  From 1/16 showed       chol 212, tg 314, hdl 40, ldl-c 109,                     hep c neg  From 7/16 showed       chol 218, tg 253, hdl 37, ldl-c 130, gluc 88, cr 0.61, gfr 106, alt 27, tsh 2.71  From 1/17 showed       chol 218, tg 229, hdl 46, ldl-c 126,            wbc 6.8, hb 16.5, plt 321, vit d 21.1    Results for orders placed or performed in visit on 01/27/17   LIPID PANEL   Result Value Ref Range    Cholesterol, total 242 (H) 100 - 199 mg/dL    Triglyceride 184 (H) 0 - 149 mg/dL    HDL Cholesterol 45 >39 mg/dL    VLDL, calculated 37 5 - 40 mg/dL    LDL, calculated 160 (H) 0 - 99 mg/dL   VITAMIN D, 25 HYDROXY   Result Value Ref Range    VITAMIN D, 25-HYDROXY 40.2 30.0 - 100.0 ng/mL   TSH 3RD GENERATION   Result Value Ref Range    TSH 2.040 0.450 - 4.500 uIU/mL   CVD REPORT   Result Value Ref Range    INTERPRETATION Note      Patient Active Problem List   Diagnosis Code    Nephrolithiasis Dr. Sajan Helm N20.0    Depression F32.9    Hypovitaminosis D E55.9    Dyslipidemia E78.5    Hypothyroidism due to acquired atrophy of thyroid E03.4    Obesity (BMI 30-39. 9) E66.9    Colon adenoma 3/16 Dr Bal Saldana D12.6     Assessment and plan:  1. HLP. Intol 5 statins. Not much response to the welchol . Another long discussion, ended up going for ca score to see if she even needs statin. If elev, we can try pitava now that her vit d is corrected  2. Hypovit d. Continue supplementation  3. HTN. Continue current  4. Hypothyroidism. Therapeutic   5. Overweight/obese. Dietary and lifestyle measures, calorie counting. Previously not interested in johnathan suppressants. 6. Anxiety. Prn xanax   7. Colon adenoma. Fiber, colo 2021  8. Right knee pain. Ortho eval  9. GI distress.   Check US abd        RTC 1/18      Above conditions discussed at length and patient vocalized understanding.   All questions answered to patient satifaction

## 2017-07-27 ENCOUNTER — OFFICE VISIT (OUTPATIENT)
Dept: INTERNAL MEDICINE CLINIC | Age: 52
End: 2017-07-27

## 2017-07-27 VITALS
HEART RATE: 76 BPM | SYSTOLIC BLOOD PRESSURE: 108 MMHG | RESPIRATION RATE: 16 BRPM | TEMPERATURE: 98.8 F | WEIGHT: 161.4 LBS | DIASTOLIC BLOOD PRESSURE: 70 MMHG | BODY MASS INDEX: 31.69 KG/M2 | HEIGHT: 60 IN | OXYGEN SATURATION: 99 %

## 2017-07-27 DIAGNOSIS — E03.4 HYPOTHYROIDISM DUE TO ACQUIRED ATROPHY OF THYROID: ICD-10-CM

## 2017-07-27 DIAGNOSIS — G89.29 CHRONIC PAIN OF RIGHT KNEE: ICD-10-CM

## 2017-07-27 DIAGNOSIS — R10.13 ABDOMINAL PAIN, EPIGASTRIC: Primary | ICD-10-CM

## 2017-07-27 DIAGNOSIS — E78.5 DYSLIPIDEMIA: ICD-10-CM

## 2017-07-27 DIAGNOSIS — M25.561 CHRONIC PAIN OF RIGHT KNEE: ICD-10-CM

## 2017-07-27 DIAGNOSIS — E55.9 HYPOVITAMINOSIS D: ICD-10-CM

## 2017-07-27 DIAGNOSIS — E66.9 OBESITY (BMI 30-39.9): ICD-10-CM

## 2017-07-27 NOTE — PROGRESS NOTES
1. Have you been to the ER, urgent care clinic or hospitalized since your last visit? YES.     2. Have you seen or consulted any other health care providers outside of the [address] [place] [address] since your last visit (Include any pap smears or colon screening)? NO      Do you have an Advanced Directive? NO    Patient Stated she has the information filled out she just need to get it notarized and bring it in.

## 2017-07-27 NOTE — MR AVS SNAPSHOT
Visit Information Date & Time Provider Department Dept. Phone Encounter #  
 7/27/2017  8:15 AM Scott Vera MD Internist of [address] [place] [address] [phone] [id number] Your Appointments 1/29/2018  8:40 AM  
Office Visit with Scott Vera MD  
Internist of [address] [place] [address] [facility]) Appt Note: 6 mos fu per RD  
 [address] [facility], [address] [address] [address] [facility] [place]  
  
   
 [address], [address] Upcoming Health Maintenance Date Due INFLUENZA AGE 9 TO ADULT 8/1/2017 BREAST CANCER SCRN MAMMOGRAM 11/7/2018 COLONOSCOPY 3/16/2021 DTaP/Tdap/Td series (2 - Td) 8/8/2022 Allergies as of 7/27/2017  Review Complete On: 7/27/2017 By: Sarbjit Lee Severity Noted Reaction Type Reactions Crestor [Rosuvastatin]    Myalgia Lipitor [Atorvastatin]    Myalgia Mevacor [Lovastatin]  01/27/2017    Myalgia Mobic [Meloxicam]    Nausea Only, Other (comments)  
 headache Niaspan [Niacin]    Other (comments)  
 flushing Percocet [Oxycodone-acetaminophen]  09/13/2013    Other (comments) Pravachol [Pravastatin]    Myalgia Tricor [Fenofibrate Micronized]    Other (comments) Elevated LFTs Zetia [Ezetimibe]  09/13/2013    Other (comments)  
 gassiness Zocor [Simvastatin]    Myalgia Current Immunizations  Reviewed on 10/19/2012 Name Date Influenza Vaccine (Quad) PF 10/2/2015 10:37 AM  
 Influenza Vaccine PF 2/27/2015 Influenza Vaccine Split 10/19/2012, 10/21/2011  2:06 PM  
 Influenza Vaccine Whole 10/15/2010 TD Vaccine 4/15/2011 TDAP Vaccine 8/8/2012 Not reviewed this visit Vitals BP Pulse Temp Resp Height(growth percentile) Weight(growth percentile) 108/70 (BP 1 Location: Left arm, BP Patient Position: Sitting) 76 98.8 °F (37.1 °C) (Oral) 16 5' (1.524 m) 161 lb 6.4 oz (73.2 kg) SpO2 BMI OB Status Smoking Status 99% 31.52 kg/m2 Hysterectomy Never Smoker Vitals History BMI and BSA Data Body Mass Index Body Surface Area  
 31.52 kg/m 2 1.76 m 2 [facility] Your Updated Medication List  
  
   
This list is accurate as of: 7/27/17  8:41 AM.  Always use your most recent med list.  
  
  
  
  
 albuterol 90 mcg/actuation inhaler Commonly known as:  PROVENTIL HFA, VENTOLIN HFA, PROAIR HFA Take 2 Puffs by inhalation every six (6) hours as needed for Wheezing. ALPRAZolam 1 mg tablet Commonly known as:  Layla Pascal Take 1 Tab by mouth three (3) times daily as needed for Anxiety. Max Daily Amount: 3 mg. amLODIPine 10 mg tablet Commonly known as:  Miri Wilder Take 1 Tab by mouth daily. citalopram 20 mg tablet Commonly known as:  Dwight Moeller Take 1 Tab by mouth daily. clotrimazole-betamethasone topical cream  
Commonly known as:  Lenny Wood Apply thin layer to affected area TWICE DAILY AS NEEDED  
  
 cyclobenzaprine 10 mg tablet Commonly known as:  FLEXERIL Take 1 Tab by mouth three (3) times daily as needed for Muscle Spasm(s). ergocalciferol 50,000 unit capsule Commonly known as:  VITAMIN D2 Take 1 Cap by mouth every seven (7) days. fluticasone 50 mcg/actuation nasal spray Commonly known as:  FLONASE  
2 sprays each nostril daily  
  
 gabapentin 600 mg tablet Commonly known as:  NEURONTIN Take 1 Tab by mouth three (3) times daily. levothyroxine 50 mcg tablet Commonly known as:  SYNTHROID Take 1 Tab by mouth Daily (before breakfast). PriLOSEC 20 mg capsule Generic drug:  omeprazole Take 20 mg by mouth daily. Introducing [facility] & HEALTH SERVICES! Dear Debroah Klinefelter: Thank you for requesting a [employer] account.   Our records indicate that you have previously registered for a [employer] account but its currently inactive. Please call our [employer] support line at [phone]. Additional Information If you have questions, please visit the Frequently Asked Questions section of the [employer] website at [email or web address] Remember, [employer] is NOT to be used for urgent needs. For medical emergencies, dial 911. Now available from your iPhone and Android! Please provide this summary of care documentation to your next provider. Your primary care clinician is listed as Myke Overton. If you have any questions after today's visit, please call [phone].

## 2017-07-28 ENCOUNTER — TELEPHONE (OUTPATIENT)
Dept: INTERNAL MEDICINE CLINIC | Age: 52
End: 2017-07-28

## 2017-07-28 NOTE — TELEPHONE ENCOUNTER
healthkeepers-  Referral to Dr Tree Sheffield- appt  08/23/17-   For issues with right knee    Fax#  [phone]

## 2017-07-29 DIAGNOSIS — E55.9 HYPOVITAMINOSIS D: ICD-10-CM

## 2017-08-07 ENCOUNTER — HOSPITAL ENCOUNTER (OUTPATIENT)
Dept: CT IMAGING | Age: 52
Discharge: HOME | End: 2017-08-07
Attending: INTERNAL MEDICINE
Payer: SELF-PAY

## 2017-08-07 ENCOUNTER — HOSPITAL ENCOUNTER (OUTPATIENT)
Dept: ULTRASOUND IMAGING | Age: 52
Discharge: HOME | End: 2017-08-07
Attending: INTERNAL MEDICINE
Payer: COMMERCIAL

## 2017-08-07 DIAGNOSIS — E78.5 DYSLIPIDEMIA: ICD-10-CM

## 2017-08-07 DIAGNOSIS — R10.13 ABDOMINAL PAIN, EPIGASTRIC: ICD-10-CM

## 2017-08-07 PROCEDURE — 75571 CT HRT W/O DYE W/CA TEST: CPT

## 2017-08-07 PROCEDURE — 76700 US EXAM ABDOM COMPLETE: CPT

## 2017-08-10 ENCOUNTER — TELEPHONE (OUTPATIENT)
Dept: CARDIAC REHAB | Age: 52
End: 2017-08-10

## 2017-08-11 ENCOUNTER — TELEPHONE (OUTPATIENT)
Dept: INTERNAL MEDICINE CLINIC | Age: 52
End: 2017-08-11

## 2017-08-11 DIAGNOSIS — R10.13 EPIGASTRIC PAIN: Primary | ICD-10-CM

## 2017-08-11 NOTE — TELEPHONE ENCOUNTER
pls call    IMPRESSION:  1. No acute findings. 2.  Hepatic steatosis with probable multiple small foci of sparing from fatty infiltration. Fatty liver as expected  No gallstones but need HIDA to r/o chronic inflammation gb - ordered    Impression. Coronary calcium score 0.       Awesome  No need for chol meds

## 2017-08-11 NOTE — TELEPHONE ENCOUNTER
The results are in however, Dr. Monie Jimenez has to read them and let us know what to tell the patient after he has read them.

## 2017-08-14 ENCOUNTER — TELEPHONE (OUTPATIENT)
Dept: CARDIAC REHAB | Age: 52
End: 2017-08-14

## 2017-08-14 NOTE — TELEPHONE ENCOUNTER
Called patient at second time to review her CT scan results. She was available today. Reviewed her CT scan results. Verified patient's date of birth. Discussed results of CT scan. Her calcium score is 0. This corresponds to no plaque noted in the coronary arteries. Her risk for a heart attack is very low. The chance of patient developing heart disease at this point is less than 1%. Patient verbalized understanding of results. Will fax report to his/her PCP, Gavino Arambula.

## 2017-08-15 NOTE — TELEPHONE ENCOUNTER
Spoke with patient and given results message below, verbalized understanding and will proceed with HIDA scan. HIDA w/intervention entered per RDs message below. Happy to hear that her Calcium score was 0.

## 2017-08-22 ENCOUNTER — TELEPHONE (OUTPATIENT)
Dept: INTERNAL MEDICINE CLINIC | Age: 52
End: 2017-08-22

## 2017-08-22 ENCOUNTER — HOSPITAL ENCOUNTER (OUTPATIENT)
Dept: NUCLEAR MEDICINE | Age: 52
Discharge: HOME | End: 2017-08-22
Attending: INTERNAL MEDICINE
Payer: COMMERCIAL

## 2017-08-22 VITALS — BODY MASS INDEX: 31.25 KG/M2 | WEIGHT: 160 LBS

## 2017-08-22 DIAGNOSIS — R10.13 EPIGASTRIC PAIN: ICD-10-CM

## 2017-08-22 PROCEDURE — 74011000258 HC RX REV CODE- 258: Performed by: INTERNAL MEDICINE

## 2017-08-22 PROCEDURE — 78227 HEPATOBIL SYST IMAGE W/DRUG: CPT

## 2017-08-22 PROCEDURE — 74011250636 HC RX REV CODE- 250/636: Performed by: INTERNAL MEDICINE

## 2017-08-22 RX ORDER — VITAMIN E 268 MG
800 CAPSULE ORAL DAILY
COMMUNITY

## 2017-08-22 RX ORDER — LANOLIN ALCOHOL/MO/W.PET/CERES
1000 CREAM (GRAM) TOPICAL DAILY
COMMUNITY

## 2017-08-22 RX ORDER — SODIUM CHLORIDE 9 MG/ML
50 INJECTION, SOLUTION INTRAVENOUS CONTINUOUS
Status: DISCONTINUED | OUTPATIENT
Start: 2017-08-22 | End: 2017-08-26 | Stop reason: HOSPADM

## 2017-08-22 RX ORDER — LANOLIN ALCOHOL/MO/W.PET/CERES
500 CREAM (GRAM) TOPICAL DAILY
COMMUNITY
End: 2019-01-31 | Stop reason: SDUPTHER

## 2017-08-22 RX ADMIN — SODIUM CHLORIDE 50 ML/HR: 900 INJECTION, SOLUTION INTRAVENOUS at 07:44

## 2017-08-22 RX ADMIN — SINCALIDE 1.45 MCG: 5 INJECTION, POWDER, LYOPHILIZED, FOR SOLUTION INTRAVENOUS at 07:44

## 2017-08-22 NOTE — TELEPHONE ENCOUNTER
[facility]eds [facility] referral for appt tomorrow.   Dr. Elenita Baca  Dx: Chronic right knee pain  NPI: [id number]  Fax: [phone]

## 2017-08-23 ENCOUNTER — OFFICE VISIT (OUTPATIENT)
Dept: ORTHOPEDIC SURGERY | Age: 52
End: 2017-08-23

## 2017-08-23 ENCOUNTER — TELEPHONE (OUTPATIENT)
Dept: INTERNAL MEDICINE CLINIC | Age: 52
End: 2017-08-23

## 2017-08-23 VITALS
SYSTOLIC BLOOD PRESSURE: 185 MMHG | DIASTOLIC BLOOD PRESSURE: 85 MMHG | BODY MASS INDEX: 31.92 KG/M2 | RESPIRATION RATE: 18 BRPM | HEART RATE: 85 BPM | OXYGEN SATURATION: 98 % | WEIGHT: 162.6 LBS | TEMPERATURE: 97.7 F | HEIGHT: 60 IN

## 2017-08-23 DIAGNOSIS — M25.561 CHRONIC PAIN OF RIGHT KNEE: Primary | ICD-10-CM

## 2017-08-23 DIAGNOSIS — G89.29 CHRONIC PAIN OF RIGHT KNEE: Primary | ICD-10-CM

## 2017-08-23 DIAGNOSIS — M17.11 PRIMARY OSTEOARTHRITIS OF RIGHT KNEE: ICD-10-CM

## 2017-08-23 RX ORDER — BETAMETHASONE SODIUM PHOSPHATE AND BETAMETHASONE ACETATE 3; 3 MG/ML; MG/ML
3 INJECTION, SUSPENSION INTRA-ARTICULAR; INTRALESIONAL; INTRAMUSCULAR; SOFT TISSUE ONCE
Qty: 0.5 ML | Refills: 0
Start: 2017-08-23 | End: 2017-08-23

## 2017-08-23 RX ORDER — BUPIVACAINE HYDROCHLORIDE 2.5 MG/ML
4 INJECTION, SOLUTION EPIDURAL; INFILTRATION; INTRACAUDAL ONCE
Qty: 4 ML | Refills: 0
Start: 2017-08-23 | End: 2017-08-23

## 2017-08-23 NOTE — PATIENT INSTRUCTIONS
Knee Arthritis: Exercises  Your Care Instructions  Here are some examples of exercises for knee arthritis. Start each exercise slowly. Ease off the exercise if you start to have pain. Your doctor or physical therapist will tell you when you can start these exercises and which ones will work best for you. How to do the exercises  Knee flexion with heel slide    1. Lie on your back with your knees bent. 2. Slide your heel back by bending your affected knee as far as you can. Then hook your other foot around your ankle to help pull your heel even farther back. 3. Hold for about 6 seconds, then rest for up to 10 seconds. 4. Repeat 8 to 12 times. 5. Switch legs and repeat steps 1 through 4, even if only one knee is sore. Quad sets    1. Sit with your affected leg straight and supported on the floor or a firm bed. Place a small, rolled-up towel under your knee. Your other leg should be bent, with that foot flat on the floor. 2. Tighten the thigh muscles of your affected leg by pressing the back of your knee down into the towel. 3. Hold for about 6 seconds, then rest for up to 10 seconds. 4. Repeat 8 to 12 times. 5. Switch legs and repeat steps 1 through 4, even if only one knee is sore. Straight-leg raises to the front    1. Lie on your back with your good knee bent so that your foot rests flat on the floor. Your affected leg should be straight. Make sure that your low back has a normal curve. You should be able to slip your hand in between the floor and the small of your back, with your palm touching the floor and your back touching the back of your hand. 2. Tighten the thigh muscles in your affected leg by pressing the back of your knee flat down to the floor. Hold your knee straight. 3. Keeping the thigh muscles tight and your leg straight, lift your affected leg up so that your heel is about 12 inches off the floor. Hold for about 6 seconds, then lower slowly.   4. Relax for up to 10 seconds between repetitions. 5. Repeat 8 to 12 times. 6. Switch legs and repeat steps 1 through 5, even if only one knee is sore. Active knee flexion    1. Lie on your stomach with your knees straight. If your kneecap is uncomfortable, roll up a washcloth and put it under your leg just above your kneecap. 2. Lift the foot of your affected leg by bending the knee so that you bring the foot up toward your buttock. If this motion hurts, try it without bending your knee quite as far. This may help you avoid any painful motion. 3. Slowly move your leg up and down. 4. Repeat 8 to 12 times. 5. Switch legs and repeat steps 1 through 4, even if only one knee is sore. Quadriceps stretch (facedown)    1. Lie flat on your stomach, and rest your face on the floor. 2. Wrap a towel or belt strap around the lower part of your affected leg. Then use the towel or belt strap to slowly pull your heel toward your buttock until you feel a stretch. 3. Hold for about 15 to 30 seconds, then relax your leg against the towel or belt strap. 4. Repeat 2 to 4 times. 5. Switch legs and repeat steps 1 through 4, even if only one knee is sore. Stationary exercise bike    If you do not have a stationary exercise bike at home, you can find one to ride at your local health club or community center. 1. Adjust the height of the bike seat so that your knee is slightly bent when your leg is extended downward. If your knee hurts when the pedal reaches the top, you can raise the seat so that your knee does not bend as much. 2. Start slowly. At first, try to do 5 to 10 minutes of cycling with little to no resistance. Then increase your time and the resistance bit by bit until you can do 20 to 30 minutes without pain. 3. If you start to have pain, rest your knee until your pain gets back to the level that is normal for you. Or cycle for less time or with less effort. Follow-up care is a key part of your treatment and safety.  Be sure to make and go to all appointments, and call your doctor if you are having problems. It's also a good idea to know your test results and keep a list of the medicines you take. Where can you learn more? Go to http://ozzie-delphine.info/. Enter C159 in the search box to learn more about \"Knee Arthritis: Exercises. \"  Current as of: March 21, 2017  Content Version: 11.3  © 2006-2017 [employer]. Care instructions adapted under license by [employer] (which disclaims liability or warranty for this information). If you have questions about a medical condition or this instruction, always ask your healthcare professional. [place] [address] any warranty or liability for your use of this information. Joint Injections: Care Instructions  Your Care Instructions  Joint injections are shots into a joint, such as the knee. They may be used to put in medicines, such as pain relievers. Or they can be used to take out fluid. Sometimes the fluid is tested in a lab. This can help find the cause of a joint problem. A corticosteroid, or steroid, shot is used to reduce inflammation in tendons or joints. It is often used to treat problems such as arthritis, tendinitis, and bursitis. Steroids can be injected directly into a painful, inflamed joint. They can also help reduce inflammation of a bursa. A bursa is a sac of fluid. It cushions and lubricates areas where tendons, ligaments, skin, muscles, or bones rub against each other. A steroid shot can sometimes help with short-term pain relief when other treatments haven't worked. If steroid shots help, pain may improve for weeks or months. Follow-up care is a key part of your treatment and safety. Be sure to make and go to all appointments, and call your doctor if you are having problems. It's also a good idea to know your test results and keep a list of the medicines you take. How can you care for yourself at home?   · Put ice or a cold pack on the area for 10 to 20 minutes at a time. Put a thin cloth between the ice and your skin. · Take anti-inflammatory medicines to reduce pain, swelling, or inflammation. These include ibuprofen (Advil, Motrin) and naproxen (Aleve). Read and follow all instructions on the label. · Avoid strenuous activities for several days, especially those that put stress on the area where you got the shot. · If you have dressings over the area, keep them clean and dry. You may remove them when your doctor tells you to. When should you call for help? Call your doctor now or seek immediate medical care if:  · You have signs of infection, such as:  ¨ Increased pain, swelling, warmth, or redness. ¨ Red streaks leading from the site. ¨ Pus draining from the site. ¨ A fever. Watch closely for changes in your health, and be sure to contact your doctor if you have any problems. Where can you learn more? Go to http://ozzie-delphine.info/. Enter N616 in the search box to learn more about \"Joint Injections: Care Instructions. \"  Current as of: March 21, 2017  Content Version: 11.3  © 0280-3140 [employer]. Care instructions adapted under license by [employer] (which disclaims liability or warranty for this information). If you have questions about a medical condition or this instruction, always ask your healthcare professional. [address] any warranty or liability for your use of this information.

## 2017-08-23 NOTE — MR AVS SNAPSHOT
Visit Information Date & Time Provider Department Dept. Phone Encounter #  
 8/23/2017 10:30 AM Charleen Marlow MD [facility] [phone] [id number] Your Appointments 1/29/2018  8:05 AM  
LAB with [facility] NURSE VISIT Internist of [facility]) Appt Note: 6 mos labs per rd  
 [address], [address] [facility] [address]  
  
   
 [address], [address]  
  
    
 1/29/2018  8:40 AM  
Office Visit with Juliana Callahan MD  
Internist of [address] [place] [address] [facility]) Appt Note: 6 mos fu per RD  
 [address], [address] [facility] [address]  
  
   
 [address], [address] Upcoming Health Maintenance Date Due INFLUENZA AGE 9 TO ADULT 8/1/2017 BREAST CANCER SCRN MAMMOGRAM 11/7/2018 COLONOSCOPY 3/16/2021 DTaP/Tdap/Td series (2 - Td) 8/8/2022 Allergies as of 8/23/2017  Review Complete On: 8/23/2017 By: Denisse Holley Severity Noted Reaction Type Reactions Crestor [Rosuvastatin]    Myalgia Lipitor [Atorvastatin]    Myalgia Mevacor [Lovastatin]  01/27/2017    Myalgia Mobic [Meloxicam]    Nausea Only, Other (comments)  
 headache Niaspan [Niacin]    Other (comments)  
 flushing Percocet [Oxycodone-acetaminophen]  09/13/2013    Other (comments) Pravachol [Pravastatin]    Myalgia Tricor [Fenofibrate Micronized]    Other (comments) Elevated LFTs Zetia [Ezetimibe]  09/13/2013    Other (comments)  
 gassiness Zocor [Simvastatin]    Myalgia Current Immunizations  Reviewed on 10/19/2012 Name Date Influenza Vaccine (Quad) PF 10/2/2015 10:37 AM  
 Influenza Vaccine PF 2/27/2015 Influenza Vaccine Split 10/19/2012, 10/21/2011  2:06 PM  
 Influenza Vaccine Whole 10/15/2010 TD Vaccine 4/15/2011 TDAP Vaccine 8/8/2012 Not reviewed this visit You Were Diagnosed With   
  
 Codes Comments Chronic pain of right knee    -  Primary ICD-10-CM: M25.561, M12.49 ICD-9-CM: 719.46, 338.29 Primary osteoarthritis of right knee     ICD-10-CM: M17.11 ICD-9-CM: 715.16 mild Vitals BP Pulse Temp Resp Height(growth percentile) Weight(growth percentile) 185/85 85 97.7 °F (36.5 °C) (Oral) 18 5' (1.524 m) 162 lb 9.6 oz (73.8 kg) SpO2 BMI OB Status Smoking Status 98% 31.76 kg/m2 Hysterectomy Never Smoker BMI and BSA Data Body Mass Index Body Surface Area 31.76 kg/m 2 1.77 m 2 [facility] [id number] [facility] Your Updated Medication List  
  
   
This list is accurate as of: 8/23/17 12:15 PM.  Always use your most recent med list.  
  
  
  
  
 albuterol 90 mcg/actuation inhaler Commonly known as:  PROVENTIL HFA, VENTOLIN HFA, PROAIR HFA Take 2 Puffs by inhalation every six (6) hours as needed for Wheezing. ALPRAZolam 1 mg tablet Commonly known as:  Jestine Pies Take 1 Tab by mouth three (3) times daily as needed for Anxiety. Max Daily Amount: 3 mg. amLODIPine 10 mg tablet Commonly known as:  So Cordial Take 1 Tab by mouth daily. citalopram 20 mg tablet Commonly known as:  Lori Rival Take 1 Tab by mouth daily. clotrimazole-betamethasone topical cream  
Commonly known as:  [facility] Apply thin layer to affected area TWICE DAILY AS NEEDED  
  
 ergocalciferol 50,000 unit capsule Commonly known as:  VITAMIN D2 Take 1 Cap by mouth every seven (7) days. fluticasone 50 mcg/actuation nasal spray Commonly known as:  FLONASE  
2 sprays each nostril daily  
  
 gabapentin 600 mg tablet Commonly known as:  NEURONTIN Take 1 Tab by mouth three (3) times daily. levothyroxine 50 mcg tablet Commonly known as:  SYNTHROID Take 1 Tab by mouth Daily (before breakfast). PriLOSEC 20 mg capsule Generic drug:  omeprazole Take 20 mg by mouth daily. * VITAMIN B-12 500 mcg tablet Generic drug:  cyanocobalamin Take 500 mcg by mouth daily. * cyanocobalamin 1,000 mcg tablet Take 1,000 mcg by mouth daily. vitamin E 400 unit capsule Commonly known as:  [address] Take 800 Units by mouth daily. * Notice: This list has 2 medication(s) that are the same as other medications prescribed for you. Read the directions carefully, and ask your doctor or other care provider to review them with you. We Performed the Following AMB POC X-RAY KNEE 3 VIEW [46189 CPT(R)] Patient Instructions Knee Arthritis: Exercises Your Care Instructions Here are some examples of exercises for knee arthritis. Start each exercise slowly. Ease off the exercise if you start to have pain. Your doctor or physical therapist will tell you when you can start these exercises and which ones will work best for you. How to do the exercises Knee flexion with heel slide 1. Lie on your back with your knees bent. 2. Slide your heel back by bending your affected knee as far as you can. Then hook your other foot around your ankle to help pull your heel even farther back. 3. Hold for about 6 seconds, then rest for up to 10 seconds. 4. Repeat 8 to 12 times. 5. Switch legs and repeat steps 1 through 4, even if only one knee is sore. [facility] 1. Sit with your affected leg straight and supported on the floor or a firm bed. Place a small, rolled-up towel under your knee. Your other leg should be bent, with that foot flat on the floor. 2. Tighten the thigh muscles of your affected leg by pressing the back of your knee down into the towel. 3. Hold for about 6 seconds, then rest for up to 10 seconds. 4. Repeat 8 to 12 times. 5. Switch legs and repeat steps 1 through 4, even if only one knee is sore. Straight-leg raises to the front 1. Lie on your back with your good knee bent so that your foot rests flat on the floor. Your affected leg should be straight. Make sure that your low back has a normal curve. You should be able to slip your hand in between the floor and the small of your back, with your palm touching the floor and your back touching the back of your hand. 2. Tighten the thigh muscles in your affected leg by pressing the back of your knee flat down to the floor. Hold your knee straight. 3. Keeping the thigh muscles tight and your leg straight, lift your affected leg up so that your heel is about 12 inches off the floor. Hold for about 6 seconds, then lower slowly. 4. Relax for up to 10 seconds between repetitions. 5. Repeat 8 to 12 times. 6. Switch legs and repeat steps 1 through 5, even if only one knee is sore. Active knee flexion 1. Lie on your stomach with your knees straight. If your kneecap is uncomfortable, roll up a washcloth and put it under your leg just above your kneecap. 2. Lift the foot of your affected leg by bending the knee so that you bring the foot up toward your buttock. If this motion hurts, try it without bending your knee quite as far. This may help you avoid any painful motion. 3. Slowly move your leg up and down. 4. Repeat 8 to 12 times. 5. Switch legs and repeat steps 1 through 4, even if only one knee is sore. Quadriceps stretch (facedown) 1. Lie flat on your stomach, and rest your face on the floor. 2. Wrap a towel or belt strap around the lower part of your affected leg. Then use the towel or belt strap to slowly pull your heel toward your buttock until you feel a stretch. 3. Hold for about 15 to 30 seconds, then relax your leg against the towel or belt strap. 4. Repeat 2 to 4 times. 5. Switch legs and repeat steps 1 through 4, even if only one knee is sore. Stationary exercise bike If you do not have a stationary exercise bike at home, you can find one to ride at your local health club or community center. 1. Adjust the height of the bike seat so that your knee is slightly bent when your leg is extended downward. If your knee hurts when the pedal reaches the top, you can raise the seat so that your knee does not bend as much. 2. Start slowly. At first, try to do 5 to 10 minutes of cycling with little to no resistance. Then increase your time and the resistance bit by bit until you can do 20 to 30 minutes without pain. 3. If you start to have pain, rest your knee until your pain gets back to the level that is normal for you. Or cycle for less time or with less effort. Follow-up care is a key part of your treatment and safety. Be sure to make and go to all appointments, and call your doctor if you are having problems. It's also a good idea to know your test results and keep a list of the medicines you take. Where can you learn more? Go to http://ozzie-delphine.info/. Enter C159 in the search box to learn more about \"Knee Arthritis: Exercises. \" Current as of: March 21, 2017 Content Version: 11.3 © 8596-3713 [employer]. Care instructions adapted under license by [employer] (which disclaims liability or warranty for this information). If you have questions about a medical condition or this instruction, always ask your healthcare professional. [address] any warranty or liability for your use of this information. Joint Injections: Care Instructions Your Care Instructions Joint injections are shots into a joint, such as the knee. They may be used to put in medicines, such as pain relievers. Or they can be used to take out fluid. Sometimes the fluid is tested in a lab. This can help find the cause of a joint problem. A corticosteroid, or steroid, shot is used to reduce inflammation in tendons or joints. It is often used to treat problems such as arthritis, tendinitis, and bursitis. Steroids can be injected directly into a painful, inflamed joint. They can also help reduce inflammation of a bursa. A bursa is a sac of fluid. It cushions and lubricates areas where tendons, ligaments, skin, muscles, or bones rub against each other. A steroid shot can sometimes help with short-term pain relief when other treatments haven't worked. If steroid shots help, pain may improve for weeks or months. Follow-up care is a key part of your treatment and safety. Be sure to make and go to all appointments, and call your doctor if you are having problems. It's also a good idea to know your test results and keep a list of the medicines you take. How can you care for yourself at home? · Put ice or a cold pack on the area for 10 to 20 minutes at a time. Put a thin cloth between the ice and your skin. · Take anti-inflammatory medicines to reduce pain, swelling, or inflammation. These include ibuprofen (Advil, Motrin) and naproxen (Aleve). Read and follow all instructions on the label. · Avoid strenuous activities for several days, especially those that put stress on the area where you got the shot. · If you have dressings over the area, keep them clean and dry. You may remove them when your doctor tells you to. When should you call for help? Call your doctor now or seek immediate medical care if: 
· You have signs of infection, such as: 
¨ Increased pain, swelling, warmth, or redness. ¨ Red streaks leading from the site. ¨ Pus draining from the site. ¨ A fever. Watch closely for changes in your health, and be sure to contact your doctor if you have any problems. Where can you learn more? Go to http://ozzie-delphine.info/. Enter N616 in the search box to learn more about \"Joint Injections: Care Instructions. \" Current as of: March 21, 2017 Content Version: 11.3 © 3727-8911 [employer].  Care instructions adapted under license by [address] (which disclaims liability or warranty for this information). If you have questions about a medical condition or this instruction, always ask your healthcare professional. [address] any warranty or liability for your use of this information. Please provide this summary of care documentation to your next provider. Your primary care clinician is listed as Salvador Junior. If you have any questions after today's visit, please call [phone].

## 2017-08-23 NOTE — PROGRESS NOTES
Patient: Narciso Wills                MRN: [id number]       SSN: [id number]  YOB: 1965        AGE: 46 y.o. SEX: female    PCP: Lia Leon MD  08/23/17    Chief Complaint   Patient presents with    Knee Pain     Right     HISTORY:  Narciso Wills is a 46 y.o. female who is seen for right knee pain. She notes pain for the past 5 months, increasing recently, and no history of injury. She has medial right knee pain. She reports pain while walking and standing. 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. 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 She lives in [place] with her , three adult children (32, 23-son, and 23), and granddaughter. Her son is about to start a HV[employer] apprentice program and her daughter is starting [employer]. Her oldest daughter works for an eye doctor. She also has two dogs. She states she stays busy with her [employer]. Current weight is 162 pounds. She is 5' tall. She is not diabetic. She is hypertensive. Lab Results   Component Value Date/Time    Hemoglobin A1c 5.2 06/10/2013 07:54 AM     Weight Metrics 8/23/2017 8/22/2017 8/22/2017 7/27/2017 1/27/2017 1/10/2017 7/25/2016   Weight 162 lb 9.6 oz 160 lb 160 lb 161 lb 6.4 oz 159 lb 162 lb 158 lb   BMI 31.76 kg/m2 31.25 kg/m2 - 31.52 kg/m2 31.05 kg/m2 31.64 kg/m2 30.86 kg/m2       Patient Active Problem List   Diagnosis Code    Nephrolithiasis Dr. Juliana Pace N20.0    Depression F32.9    Hypovitaminosis D E55.9    Dyslipidemia E78.5    Hypothyroidism due to acquired atrophy of thyroid E03.4    Obesity (BMI 30-39. 9) E66.9    Colon adenoma 3/16 Dr Boyd [place] D12.6     REVIEW OF SYSTEMS: All Below are Negative except: See HPI   Constitutional: negative for fever, chills, and weight loss. Cardiovascular: negative for chest pain, claudication, leg swelling, SOB, JOY   Gastrointestinal: Negative for pain, N/V/C/D, Blood in stool or urine, dysuria,  hematuria, incontinence, pelvic pain. Musculoskeletal: See HPI   Neurological: Negative for dizziness and weakness. Negative for headaches, Visual changes, confusion, seizures   Phychiatric/Behavioral: Negative for depression, memory loss, substance  abuse. Extremities: Negative for hair changes, rash, or skin lesion changes. Hematologic: Negative for bleeding problems, bruising, pallor or swollen lymph  nodes   Peripheral Vascular: No calf pain, no circulation deficits. Social History     Social History    Marital status:      Spouse name: N/A    Number of children: 4    Years of education: N/A     Occupational History    housewife      Social History Main Topics    Smoking status: Never Smoker    Smokeless tobacco: Never Used    Alcohol use No    Drug use: No    Sexual activity: Not on file     Other Topics Concern    Not on file     Social History Narrative      Allergies   Allergen Reactions    Crestor [Rosuvastatin] Myalgia    Lipitor [Atorvastatin] Myalgia    Mevacor [Lovastatin] Myalgia    Mobic [Meloxicam] Nausea Only and Other (comments)     headache    Niaspan [Niacin] Other (comments)     flushing    Percocet [Oxycodone-Acetaminophen] Other (comments)    Pravachol [Pravastatin] Myalgia    Tricor [Fenofibrate Micronized] Other (comments)     Elevated LFTs    Zetia [Ezetimibe] Other (comments)     gassiness    Zocor [Simvastatin] Myalgia      Current Outpatient Prescriptions   Medication Sig    cyanocobalamin (VITAMIN B-12) 500 mcg tablet Take 500 mcg by mouth daily.  vitamin E (AQUA GEMS) 400 unit capsule Take 800 Units by mouth daily.     cyanocobalamin 1,000 mcg tablet Take 1,000 mcg by mouth daily.  ALPRAZolam (XANAX) 1 mg tablet Take 1 Tab by mouth three (3) times daily as needed for Anxiety. Max Daily Amount: 3 mg.  levothyroxine (SYNTHROID) 50 mcg tablet Take 1 Tab by mouth Daily (before breakfast).  amLODIPine (NORVASC) 10 mg tablet Take 1 Tab by mouth daily.  citalopram (CELEXA) 20 mg tablet Take 1 Tab by mouth daily.  gabapentin (NEURONTIN) 600 mg tablet Take 1 Tab by mouth three (3) times daily.  ergocalciferol (VITAMIN D2) 50,000 unit capsule Take 1 Cap by mouth every seven (7) days.  albuterol (PROVENTIL HFA, VENTOLIN HFA, PROAIR HFA) 90 mcg/actuation inhaler Take 2 Puffs by inhalation every six (6) hours as needed for Wheezing.  clotrimazole-betamethasone (LOTRISONE) topical cream Apply thin layer to affected area TWICE DAILY AS NEEDED    fluticasone (FLONASE) 50 mcg/actuation nasal spray 2 sprays each nostril daily (Patient taking differently: as needed. 2 sprays each nostril daily)    omeprazole (PRILOSEC) 20 mg capsule Take 20 mg by mouth daily. No current facility-administered medications for this visit.       Facility-Administered Medications Ordered in Other Visits   Medication Dose Route Frequency    0.9% sodium chloride infusion  50 mL/hr IntraVENous CONTINUOUS      PHYSICAL EXAMINATION:  Visit Vitals    /85    Pulse 85    Temp 97.7 °F (36.5 °C) (Oral)    Resp 18    Ht 5' (1.524 m)    Wt 162 lb 9.6 oz (73.8 kg)    SpO2 98%    BMI 31.76 kg/m2      ORTHO EXAMINATION:  Examination Right knee Left knee   Skin Intact Intact   Range of motion 100-0 120-0   Effusion - -   Medial joint line tenderness + +   Lateral joint line tenderness - -   Popliteal tenderness - -   Osteophytes palpable +, small medial -   Lyles - -   Patella crepitus - -   Anterior drawer - -   Lateral laxity - -   Medial laxity - -   Varus deformity - -   Valgus deformity - -   Pretibial edema 1+ -   Calf tenderness - - PROCEDURE:  After discussing treatment options, patient's right knee was injected with 4 cc Marcaine and 1/2 cc Celestone. Chart reviewed for the following:   Zelalem Hooks MD, have reviewed the History, Physical and updated the Allergic reactions for [address] performed immediately prior to start of procedure:  Zelalem Hooks MD, have performed the following reviews on [address] prior to the start of the procedure:            * Patient was identified by name and date of birth   * Agreement on procedure being performed was verified  * Risks and Benefits explained to the patient  * Procedure site verified and marked as necessary  * Patient was positioned for comfort  * Consent was obtained     Time: 12:10 PM     Date of procedure: 8/23/2017    Procedure performed by:  Chris Bentley MD    Ms. Castillo tolerated the procedure well with no complications. RADIOGRAPHS:  XR RIGHT KNEE 8/23/17  IMPRESSION:  Three views - No fractures, no effusion, mild joint space narrowing, no osteophytes present. Mild flattening of the condyle     IMPRESSION:      ICD-10-CM ICD-9-CM    1. Chronic pain of right knee M25.561 719.46 AMB POC X-RAY KNEE 3 VIEW    G89.29 338.29 betamethasone (CELESTONE SOLUSPAN) 6 mg/mL injection      BETAMETHASONE ACETATE & SODIUM PHOSPHATE INJECTION 3 MG EA.      DRAIN/INJECT LARGE JOINT/BURSA      bupivacaine, PF, (MARCAINE, PF,) 0.25 % (2.5 mg/mL) injection      PROCEDURE AUTHORIZATION TO    2. Primary osteoarthritis of right knee M17.11 715.16 betamethasone (CELESTONE SOLUSPAN) 6 mg/mL injection      BETAMETHASONE ACETATE & SODIUM PHOSPHATE INJECTION 3 MG EA.      DRAIN/INJECT LARGE JOINT/BURSA      bupivacaine, PF, (MARCAINE, PF,) 0.25 % (2.5 mg/mL) injection      PROCEDURE AUTHORIZATION TO     mild     PLAN:  After discussing treatment options, patient's right knee was injected with 4 cc Marcaine and 1/2 cc Celestone.   She will follow up as needed. She will follow up with her primary care physician for hypertension. There is no need for surgery at this time. We discussed a possible right knee MRI in the future if pain continues. Consider visco supplementation if pain continues. She will take OTC NSAIDs for pain.      Scribed by Cole Harvey ([facility]) as dictated by Gradie Aase, MD

## 2017-08-29 ENCOUNTER — ANESTHESIA EVENT (OUTPATIENT)
Dept: ENDOSCOPY | Age: 52
End: 2017-08-29
Payer: COMMERCIAL

## 2017-08-30 ENCOUNTER — ANESTHESIA (OUTPATIENT)
Dept: ENDOSCOPY | Age: 52
End: 2017-08-30
Payer: COMMERCIAL

## 2017-08-30 ENCOUNTER — HOSPITAL ENCOUNTER (OUTPATIENT)
Age: 52
Setting detail: OUTPATIENT SURGERY
Discharge: HOME | End: 2017-08-30
Attending: COLON & RECTAL SURGERY | Admitting: COLON & RECTAL SURGERY
Payer: COMMERCIAL

## 2017-08-30 VITALS
BODY MASS INDEX: 31.41 KG/M2 | SYSTOLIC BLOOD PRESSURE: 108 MMHG | OXYGEN SATURATION: 98 % | DIASTOLIC BLOOD PRESSURE: 71 MMHG | HEIGHT: 60 IN | HEART RATE: 67 BPM | RESPIRATION RATE: 20 BRPM | TEMPERATURE: 98 F | WEIGHT: 160 LBS

## 2017-08-30 PROCEDURE — 74011250636 HC RX REV CODE- 250/636

## 2017-08-30 PROCEDURE — 76040000019: Performed by: COLON & RECTAL SURGERY

## 2017-08-30 PROCEDURE — 74011250636 HC RX REV CODE- 250/636: Performed by: NURSE ANESTHETIST, CERTIFIED REGISTERED

## 2017-08-30 PROCEDURE — 76060000031 HC ANESTHESIA FIRST 0.5 HR: Performed by: COLON & RECTAL SURGERY

## 2017-08-30 PROCEDURE — 74011000250 HC RX REV CODE- 250

## 2017-08-30 RX ORDER — LIDOCAINE HYDROCHLORIDE 20 MG/ML
INJECTION, SOLUTION EPIDURAL; INFILTRATION; INTRACAUDAL; PERINEURAL AS NEEDED
Status: DISCONTINUED | OUTPATIENT
Start: 2017-08-30 | End: 2017-08-30 | Stop reason: HOSPADM

## 2017-08-30 RX ORDER — PROPOFOL 10 MG/ML
INJECTION, EMULSION INTRAVENOUS AS NEEDED
Status: DISCONTINUED | OUTPATIENT
Start: 2017-08-30 | End: 2017-08-30 | Stop reason: HOSPADM

## 2017-08-30 RX ORDER — SODIUM CHLORIDE, SODIUM LACTATE, POTASSIUM CHLORIDE, CALCIUM CHLORIDE 600; 310; 30; 20 MG/100ML; MG/100ML; MG/100ML; MG/100ML
75 INJECTION, SOLUTION INTRAVENOUS CONTINUOUS
Status: DISCONTINUED | OUTPATIENT
Start: 2017-08-30 | End: 2017-08-30 | Stop reason: HOSPADM

## 2017-08-30 RX ADMIN — PROPOFOL 50 MG: 10 INJECTION, EMULSION INTRAVENOUS at 08:22

## 2017-08-30 RX ADMIN — LIDOCAINE HYDROCHLORIDE 40 MG: 20 INJECTION, SOLUTION EPIDURAL; INFILTRATION; INTRACAUDAL; PERINEURAL at 08:20

## 2017-08-30 RX ADMIN — PROPOFOL 80 MG: 10 INJECTION, EMULSION INTRAVENOUS at 08:20

## 2017-08-30 RX ADMIN — SODIUM CHLORIDE, SODIUM LACTATE, POTASSIUM CHLORIDE, AND CALCIUM CHLORIDE 75 ML/HR: 600; 310; 30; 20 INJECTION, SOLUTION INTRAVENOUS at 07:33

## 2017-08-30 RX ADMIN — PROPOFOL 50 MG: 10 INJECTION, EMULSION INTRAVENOUS at 08:24

## 2017-08-30 RX ADMIN — PROPOFOL 20 MG: 10 INJECTION, EMULSION INTRAVENOUS at 08:26

## 2017-08-30 NOTE — DISCHARGE INSTRUCTIONS
DISCHARGE SUMMARY from Nurse      Recommendations: -Repeat colonoscopy in 5 years. Resume normal medication(s). POST-PROCEDURE INSTRUCTIONS:    Call your Physician if you:  ? Observe any excess bleeding. ? Develop a temperature over 100.5o F.  ? Experience abdominal, shoulder or chest pain. ? Notice any signs of decreased circulation or nerve impairment to an extremity such as a change in color, persistent numbness, tingling, coldness or increase in pain. ? Vomit blood or you have nausea and vomiting lasting longer than 4 hours. ? Are unable to take medications. ? Are unable to urinate within 8 hours after discharge following general anesthesia or intravenous sedation. For the next 24 hours after receiving general anesthesia or intravenous sedation, or while taking prescription Narcotics, limit your activities:  ? Do NOT drive a motor vehicle, operate hazard machinery or power tools, or perform tasks that require coordination. The medication you received during your procedure may have some effect on your mental awareness. ? Do NOT make important personal or business decisions. The medication you received during your procedure may have some effect on your mental awareness. ? Do NOT drink alcoholic beverages. These drinks do not mix well with the medications that have been given to you. ? Upon discharge from the hospital, you must be accompanied by a responsible adult. ? Resume your diet as directed by your physician. ? Resume medications as your physician has prescribed. ? Please give a list of your current medications to your Primary Care Provider. ? Please update this list whenever your medications are discontinued, doses are changed, or new medications (including over-the-counter products) are added. ? Please carry medication information at all times in case of emergency situations.                         These are general instructions for a healthy lifestyle:    No smoking/ No tobacco products/ Avoid exposure to second hand smoke.  Surgeon General's Warning:  Quitting smoking now greatly reduces serious risk to your health. Obesity, smoking, and a sedentary lifestyle greatly increase your risk for illness.  A healthy diet, regular physical exercise & weight monitoring are important for maintaining a healthy lifestyle   You may be retaining fluid if you have a history of heart failure or if you experience any of the following symptoms:  Weight gain of 3 pounds or more overnight or 5 pounds in a week, increased swelling in our hands or feet or shortness of breath while lying flat in bed. Please call your doctor as soon as you notice any of these symptoms; do not wait until your next office visit. Recognize signs and symptoms of STROKE:  F  -  Face looks uneven  A  -  Arms unable to move or move unevenly  S  -  Speech slurred or non-existent  T  -  Time to call 911 - as soon as signs and symptoms begin - DO NOT go back to bed or wait to see If you get better - TIME IS BRAIN. Colorectal Screening   Colorectal cancer almost always develops from precancerous polyps (abnormal growths) in the colon or rectum. Screening tests can find precancerous polyps, so that they can be removed before they turn into cancer. Screening tests can also find colorectal cancer early, when treatment works best.  Clarisse Rogers Speak with your physician about when you should begin screening and how often you should be tested. [employer] Activation    Thank you for requesting access to [employer]. Please follow the instructions below to securely access and download your online medical record. [employer] allows you to send messages to your doctor, view your test results, renew your prescriptions, schedule appointments, and more. How Do I Sign Up? 1. In your internet browser, go to [email or web address]. 2. Click on the First Time User?  Click Here link in the Sign In box. You will see the New Member Sign Up page. 3. Enter your [employer] Access Code exactly as it appears below. You will not need to use this code after youve completed the sign-up process. If you do not sign up before the expiration date, you must request a new code. MyChart Access Code: Activation code not generated  Current [facility] Status: Patient Declined (This is the date your MyChart access code will )    4. Enter the last four digits of your Social Security Number (xxxx) and Date of Birth (mm/dd/yyyy) as indicated and click Submit. You will be taken to the next sign-up page. 5. Create a [employer] ID. This will be your [employer] login ID and cannot be changed, so think of one that is secure and easy to remember. 6. Create a [employer] password. You can change your password at any time. 7. Enter your Password Reset Question and Answer. This can be used at a later time if you forget your password. 8. Enter your e-mail address. You will receive e-mail notification when new information is available in [address]. 9. Click Sign Up. You can now view and download portions of your medical record. 10. Click the Download Summary menu link to download a portable copy of your medical information. Additional Information    If you have questions, please call [phone]. Remember, [employer] is NOT to be used for urgent needs. For medical emergencies, dial 911. Discharge information has been reviewed with the patient. The patient verbalized understanding.

## 2017-08-30 NOTE — PROCEDURES
[facility]  [facility], [address]  [phone]                    Colonoscopy Procedure Note      Chloe Calabrese  1965  [id number]                Date of Procedure: 8/30/2017    Preoperative diagnosis: Z12.11,  Colon cancer Screening, personal history of polyps, family history of polyps    Postoperative diagnosis: Normal Colon    :  Grady Caal MD    Assistant(s): Endoscopy Technician-1: Kevin Boone  Endoscopy RN-1: Gloria Hastings RN    Sedation: MAC    Procedure Details:  Prior to the procedure, a history and physical were performed. The patients medications, allergies and sensitivities were reviewed and all questions were answered. After informed consent was obtained for the procedure, with all risks and benefits of procedure explained. The patient was taken to the endoscopy suite and placed in the left lateral decubitus position. Patient identification and proposed procedure were verified prior to the procedure by the nurse and I. Following sequential administration of sedation as per Anesthesia, a digital rectal exam was performed and was normal.  The Olympus video colonoscope was introduced through the anus and advanced to terminal ileum. The quality of preparation was good. The colonoscope was slowly withdrawn and the mucosa examined for any abnormalities. Cecal withdrawl time was greater than six minutes. The patient tolerated the procedure well. Findings/Interventions:   Polyps - none. EBL: none    Recommendations: -Repeat colonoscopy in 5 years. Resume normal medication(s).      Discharge Disposition:  River Penaloza MD  8/30/2017  8:40 AM

## 2017-08-30 NOTE — ANESTHESIA POSTPROCEDURE EVALUATION
Post-Anesthesia Evaluation and Assessment    Patient: Ernestina Aguero MRN: [id number]  SSN: [id number]    YOB: 1965  Age: 46 y.o. Sex: female       Cardiovascular Function/Vital Signs  Visit Vitals    /71    Pulse 67    Temp 36.7 °C (98 °F)    Resp 20    Ht 5' (1.524 m)    Wt 72.6 kg (160 lb)    SpO2 98%    Breastfeeding No    BMI 31.25 kg/m2       Patient is status post MAC anesthesia for Procedure(s):  COLONOSCOPY. Nausea/Vomiting: None    Postoperative hydration reviewed and adequate. Pain:  Pain Scale 1: Numeric (0 - 10) (08/30/17 0905)  Pain Intensity 1: 0 (08/30/17 0905)   Managed    Neurological Status: At baseline    Mental Status and Level of Consciousness: Arousable    Pulmonary Status:   O2 Device: Room air (08/30/17 0855)   Adequate oxygenation and airway patent    Complications related to anesthesia: None    Post-anesthesia assessment completed.  No concerns    Signed By: Kristin Tristan MD     August 30, 2017

## 2017-08-30 NOTE — IP AVS SNAPSHOT
[address] [facility] 
 
 
 [address] [id number] [phone] Patient: Saadia Coker MRN: [id number] :1965 You are allergic to the following Allergen Reactions Crestor (Rosuvastatin) Myalgia Lipitor (Atorvastatin) Myalgia Mevacor (Lovastatin) Myalgia Mobic (Meloxicam) Nausea Only Other (comments)  
 headache Niaspan (Niacin) Other (comments)  
 flushing Percocet (Oxycodone-Acetaminophen) Other (comments) Pravachol (Pravastatin) Myalgia Tricor (Fenofibrate Micronized) Other (comments) Elevated LFTs Zetia (Ezetimibe) Other (comments)  
 gassiness Zocor (Simvastatin) Myalgia Recent Documentation Height Weight Breastfeeding? BMI OB Status Smoking Status 1.524 m 72.6 kg No 31.25 kg/m2 Hysterectomy Never Smoker Emergency Contacts Name Discharge Info Relation Home Work Mobile Izaiah Castillo DISCHARGE CAREGIVER [3] Spouse [3] [phone] About your hospitalization You were admitted on:  2017 You last received care in the:  [facility] You were discharged on:  2017 Unit phone number:  [phone] Why you were hospitalized Your primary diagnosis was:  Not on File Providers Seen During Your Hospitalizations Provider Role Specialty Primary office phone Tayo Dumont MD Attending Provider Colon and Rectal Surgery [phone] Your Primary Care Physician (PCP) Primary Care Physician Office Phone Office Fax Lidia Jackson [phone] Follow-up Information Follow up With Details Comments Contact Info Sonali Eduardo MD   [address] [facility] [address] [facility] 
[phone] Current Discharge Medication List  
  
CONTINUE these medications which have CHANGED Dose & Instructions Dispensing Information Comments Morning Noon Evening Bedtime  
 fluticasone 50 mcg/actuation nasal spray Commonly known as:  [place] What changed:   
- when to take this 
- reasons to take this 
- additional instructions Your last dose was: Your next dose is:    
   
   
 2 sprays each nostril daily Quantity:  1 Bottle Refills:  12 CONTINUE these medications which have NOT CHANGED Dose & Instructions Dispensing Information Comments Morning Noon Evening Bedtime  
 albuterol 90 mcg/actuation inhaler Commonly known as:  PROVENTIL HFA, VENTOLIN HFA, PROAIR HFA Your last dose was: Your next dose is:    
   
   
 Dose:  2 Puff Take 2 Puffs by inhalation every six (6) hours as needed for Wheezing. Quantity:  1 Inhaler Refills:  5 ALPRAZolam 1 mg tablet Commonly known as:  Lucia  Your last dose was: Your next dose is:    
   
   
 Dose:  1 mg Take 1 Tab by mouth three (3) times daily as needed for Anxiety. Max Daily Amount: 3 mg. Quantity:  40 Tab Refills:  1  
     
   
   
   
  
 amLODIPine 10 mg tablet Commonly known as:  Anupama Palomo Your last dose was: Your next dose is:    
   
   
 Dose:  10 mg Take 1 Tab by mouth daily. Quantity:  30 Tab Refills:  11  
     
   
   
   
  
 citalopram 20 mg tablet Commonly known as:  Josue Posadas Your last dose was: Your next dose is:    
   
   
 Dose:  20 mg Take 1 Tab by mouth daily. Quantity:  90 Tab Refills:  3  
     
   
   
   
  
 clotrimazole-betamethasone topical cream  
Commonly known as:  Gloria Mamurphys Your last dose was: Your next dose is:    
   
   
 Apply thin layer to affected area TWICE DAILY AS NEEDED Quantity:  30 g Refills:  1  
     
   
   
   
  
 ergocalciferol 50,000 unit capsule Commonly known as:  VITAMIN D2 Your last dose was: Your next dose is:    
   
   
 Dose:  92956 Units Take 1 Cap by mouth every seven (7) days. Quantity:  13 Cap Refills:  3  
     
   
   
   
  
 gabapentin 600 mg tablet Commonly known as:  NEURONTIN Your last dose was: Your next dose is:    
   
   
 Dose:  600 mg Take 1 Tab by mouth three (3) times daily. Quantity:  90 Tab Refills:  11  
     
   
   
   
  
 levothyroxine 50 mcg tablet Commonly known as:  SYNTHROID Your last dose was: Your next dose is:    
   
   
 Dose:  50 mcg Take 1 Tab by mouth Daily (before breakfast). Quantity:  30 Tab Refills:  11 PriLOSEC 20 mg capsule Generic drug:  omeprazole Your last dose was: Your next dose is:    
   
   
 Dose:  20 mg Take 20 mg by mouth daily. Refills:  0  
     
   
   
   
  
 * VITAMIN B-12 500 mcg tablet Generic drug:  cyanocobalamin Your last dose was: Your next dose is:    
   
   
 Dose:  500 mcg Take 500 mcg by mouth daily. Refills:  0  
     
   
   
   
  
 * cyanocobalamin 1,000 mcg tablet Your last dose was: Your next dose is:    
   
   
 Dose:  1000 mcg Take 1,000 mcg by mouth daily. Refills:  0  
     
   
   
   
  
 vitamin E 400 unit capsule Commonly known as:  [address] Your last dose was: Your next dose is:    
   
   
 Dose:  800 Units Take 800 Units by mouth daily. Refills:  0  
     
   
   
   
  
 * Notice: This list has 2 medication(s) that are the same as other medications prescribed for you. Read the directions carefully, and ask your doctor or other care provider to review them with you. Discharge Instructions DISCHARGE SUMMARY from Nurse  
  
Recommendations: -Repeat colonoscopy in 5 years. Resume normal medication(s). POST-PROCEDURE INSTRUCTIONS: 
 
Call your Physician if you: 
? Observe any excess bleeding. ?  Develop a temperature over 100.5o F. 
 ? Experience abdominal, shoulder or chest pain. ? Notice any signs of decreased circulation or nerve impairment to an extremity such as a change in color, persistent numbness, tingling, coldness or increase in pain. ? Vomit blood or you have nausea and vomiting lasting longer than 4 hours. ? Are unable to take medications. ? Are unable to urinate within 8 hours after discharge following general anesthesia or intravenous sedation. For the next 24 hours after receiving general anesthesia or intravenous sedation, or while taking prescription Narcotics, limit your activities: 
? Do NOT drive a motor vehicle, operate hazard machinery or power tools, or perform tasks that require coordination. The medication you received during your procedure may have some effect on your mental awareness. ? Do NOT make important personal or business decisions. The medication you received during your procedure may have some effect on your mental awareness. ? Do NOT drink alcoholic beverages. These drinks do not mix well with the medications that have been given to you. ? Upon discharge from the hospital, you must be accompanied by a responsible adult. ? Resume your diet as directed by your physician. ? Resume medications as your physician has prescribed. ? Please give a list of your current medications to your Primary Care Provider. ? Please update this list whenever your medications are discontinued, doses are changed, or new medications (including over-the-counter products) are added. ? Please carry medication information at all times in case of emergency situations. These are general instructions for a healthy lifestyle: No smoking/ No tobacco products/ Avoid exposure to second hand smoke. ? Surgeon General's Warning:  Quitting smoking now greatly reduces serious risk to your health. Obesity, smoking, and a sedentary lifestyle greatly increase your risk for illness. ? A healthy diet, regular physical exercise & weight monitoring are important for maintaining a healthy lifestyle ? You may be retaining fluid if you have a history of heart failure or if you experience any of the following symptoms:  Weight gain of 3 pounds or more overnight or 5 pounds in a week, increased swelling in our hands or feet or shortness of breath while lying flat in bed. Please call your doctor as soon as you notice any of these symptoms; do not wait until your next office visit. Recognize signs and symptoms of STROKE: 
F  -  Face looks uneven A  -  Arms unable to move or move unevenly S  -  Speech slurred or non-existent T  -  Time to call 911 - as soon as signs and symptoms begin - DO NOT go back to bed or wait to see If you get better - TIME IS BRAIN. Colorectal Screening ? Colorectal cancer almost always develops from precancerous polyps (abnormal growths) in the colon or rectum. Screening tests can find precancerous polyps, so that they can be removed before they turn into cancer. Screening tests can also find colorectal cancer early, when treatment works best. 
? Speak with your physician about when you should begin screening and how often you should be tested. ImageProtect Activation Thank you for requesting access to ImageProtect. Please follow the instructions below to securely access and download your online medical record. ImageProtect allows you to send messages to your doctor, view your test results, renew your prescriptions, schedule appointments, and more. How Do I Sign Up? 1. In your internet browser, go to [email or web address]. 2. Click on the First Time User? Click Here link in the Sign In box. You will see the New Member Sign Up page. 3. Enter your ImageProtect Access Code exactly as it appears below. You will not need to use this code after youve completed the sign-up process.  If you do not sign up before the expiration date, you must request a new code. sevenload Access Code: Activation code not generated Current sevenload Status: Patient Declined (This is the date your Mavrxt access code will ) 4. Enter the last four digits of your Social Security Number (xxxx) and Date of Birth (mm/dd/yyyy) as indicated and click Submit. You will be taken to the next sign-up page. 5. Create a Mavrxt ID. This will be your sevenload login ID and cannot be changed, so think of one that is secure and easy to remember. 6. Create a sevenload password. You can change your password at any time. 7. Enter your Password Reset Question and Answer. This can be used at a later time if you forget your password. 8. Enter your e-mail address. You will receive e-mail notification when new information is available in [address] 9. Click Sign Up. You can now view and download portions of your medical record. 10. Click the Download Summary menu link to download a portable copy of your medical information. Additional Information If you have questions, please call [phone]. Remember, sevenload is NOT to be used for urgent needs. For medical emergencies, dial 911. Educational references and/or instructions provided during this visit included: 
 
{[facility] DOC TITLES:94645} APPOINTMENTS: 
 
{[facility] GI APPTS:08276} Discharge information has been reviewed with the {PATIENT PARENT GUARDIAN:35906}. The {PATIENT PARENT GUARDIAN:29460} verbalized understanding. Discharge Orders None General Information Please provide this summary of care documentation to your next provider. Patient Signature:  ____________________________________________________________ Date:  ____________________________________________________________  
  
Gwendloyn Finger Provider Signature:  ____________________________________________________________ Date:  ____________________________________________________________

## 2017-08-30 NOTE — ANESTHESIA PREPROCEDURE EVALUATION
Anesthetic History               Review of Systems / Medical History      Pulmonary                   Neuro/Psych         Psychiatric history     Cardiovascular    Hypertension: well controlled                   GI/Hepatic/Renal     GERD: well controlled      Liver disease     Endo/Other      Hypothyroidism: well controlled  Morbid obesity     Other Findings   Comments:   Risk Factors for Postoperative nausea/vomiting:       History of postoperative nausea/vomiting? NO       Female? YES       Motion sickness? NO       Intended opioid administration for postoperative analgesia? NO      Smoking Abstinence  Current Smoker? NO  Elective Surgery? YES  Seen preoperatively by anesthesiologist or proxy prior to day of surgery? YES  Pt abstained from smoking 24 hours prior to anesthesia?  N/A           Physical Exam    Airway  Mallampati: III  TM Distance: > 6 cm  Neck ROM: normal range of motion   Mouth opening: Normal     Cardiovascular    Rhythm: regular  Rate: normal         Dental  No notable dental hx       Pulmonary  Breath sounds clear to auscultation               Abdominal  GI exam deferred       Other Findings            Anesthetic Plan    ASA: 2  Anesthesia type: MAC          Induction: Intravenous  Anesthetic plan and risks discussed with: Patient

## 2017-08-30 NOTE — H&P
HPI: Melissa De León is a 46 y.o. female presenting with chief complain of history of polyp    Past Medical History:   Diagnosis Date    Anxiety     Colon adenoma 3/16    Dr Bal Saldana    Depression     Dyslipidemia     intol 5 statins/tricor    Fatty liver     negative serologies ; Fib-4 was 0.3 from 7/15    FHx: heart disease     GERD (gastroesophageal reflux disease)     Hypertension     Hypothyroidism     Hypovitaminosis D     Migraines     CT head negative     Nephrolithiasis 1999    Neuropathy ([address] 2006    on [facility] Dr. Eugenie Sanz Obesity     peak weight 163 lbs, bmi 31.3 from 2/15; dec johnathan suppressants, med supervised wt loss    Seasonal allergies     Tension headache        Past Surgical History:   Procedure Laterality Date    HX  SECTION      x4 due to cephalopelvic disproportion    HX ORTHOPAEDIC      left foot    HX TONSILLECTOMY      HX TOTAL VAGINAL HYSTERECTOMY      HX TUBAL LIGATION      bilateral    [place] COLSC FLX W/RMVL OF TUMOR [address]      Dr. Bal Saldana 3/16 adenoma    VASCULAR SURGERY PROCEDURE UNLIST      NADEGE 1.29/1.17       Family History   Problem Relation Age of Onset    Hypertension Mother     Diabetes Mother    [de-identified] Elevated Lipids Father     Colon Polyps Father     Hypertension Sister     Diabetes Maternal Grandmother     Stroke Maternal Grandmother     Heart Disease Paternal Grandmother     Hypertension Paternal Grandmother     Heart Disease Paternal Grandfather     Hypertension Paternal Grandfather        Social History     Social History    Marital status:      Spouse name: N/A    Number of children: 3    Years of education: N/A     Occupational History    housewife      Social History Main Topics    Smoking status: Never Smoker    Smokeless tobacco: Never Used    Alcohol use No    Drug use: No    Sexual activity: Not Asked     Other Topics Concern    None     Social History Narrative       Review of Systems - negative    Outpatient Prescriptions Marked as Taking for the 8/30/17 encounter [facility] Encounter)   Medication Sig Dispense Refill    cyanocobalamin (VITAMIN B-12) 500 mcg tablet Take 500 mcg by mouth daily.  vitamin E (AQUA GEMS) 400 unit capsule Take 800 Units by mouth daily.  cyanocobalamin 1,000 mcg tablet Take 1,000 mcg by mouth daily.  ALPRAZolam (XANAX) 1 mg tablet Take 1 Tab by mouth three (3) times daily as needed for Anxiety. Max Daily Amount: 3 mg. 40 Tab 1    levothyroxine (SYNTHROID) 50 mcg tablet Take 1 Tab by mouth Daily (before breakfast). 30 Tab 11    amLODIPine (NORVASC) 10 mg tablet Take 1 Tab by mouth daily. 30 Tab 11    citalopram (CELEXA) 20 mg tablet Take 1 Tab by mouth daily. 90 Tab 3    gabapentin (NEURONTIN) 600 mg tablet Take 1 Tab by mouth three (3) times daily. 90 Tab 11    ergocalciferol (VITAMIN D2) 50,000 unit capsule Take 1 Cap by mouth every seven (7) days. 13 Cap 3    albuterol (PROVENTIL HFA, VENTOLIN HFA, PROAIR HFA) 90 mcg/actuation inhaler Take 2 Puffs by inhalation every six (6) hours as needed for Wheezing. 1 Inhaler 5    clotrimazole-betamethasone (LOTRISONE) topical cream Apply thin layer to affected area TWICE DAILY AS NEEDED 30 g 1    fluticasone (FLONASE) 50 mcg/actuation nasal spray 2 sprays each nostril daily (Patient taking differently: as needed. 2 sprays each nostril daily) 1 Bottle 12    omeprazole (PRILOSEC) 20 mg capsule Take 20 mg by mouth daily.          Allergies   Allergen Reactions    Crestor [Rosuvastatin] Myalgia    Lipitor [Atorvastatin] Myalgia    Mevacor [Lovastatin] Myalgia    Mobic [Meloxicam] Nausea Only and Other (comments)     headache    Niaspan [Niacin] Other (comments)     flushing    Percocet [Oxycodone-Acetaminophen] Other (comments)    Pravachol [Pravastatin] Myalgia    Tricor [Fenofibrate Micronized] Other (comments)     Elevated LFTs    Zetia [Ezetimibe] Other (comments)     gassiness    Zocor [Simvastatin] Myalgia       Vitals:    08/22/17 0951   Weight: 72.6 kg (160 lb)   Height: 5' (1.524 m)       Physical Exam   Constitutional: She appears well-developed and well-nourished. HENT:   Head: Normocephalic and atraumatic. Eyes: Conjunctivae and EOM are normal.   Abdominal: Soft. She exhibits no distension. There is no tenderness. Musculoskeletal: Normal range of motion. Lymphadenopathy:     She has no cervical adenopathy. Right: No inguinal adenopathy present. Left: No inguinal adenopathy present. Neurological: She exhibits normal muscle tone. Skin: Skin is warm and dry. No rash noted. Psychiatric: She has a normal mood and affect. Her speech is normal.       Assessment / Plan    colonoscopy    The diagnoses and plan were discussed with the patient. All questions answered. Plan of care agreed to by all concerned.

## 2017-09-13 ENCOUNTER — OFFICE VISIT (OUTPATIENT)
Dept: ORTHOPEDIC SURGERY | Age: 52
End: 2017-09-13

## 2017-09-13 VITALS
DIASTOLIC BLOOD PRESSURE: 85 MMHG | HEIGHT: 60 IN | OXYGEN SATURATION: 95 % | RESPIRATION RATE: 16 BRPM | WEIGHT: 162 LBS | BODY MASS INDEX: 31.8 KG/M2 | TEMPERATURE: 97.2 F | SYSTOLIC BLOOD PRESSURE: 119 MMHG | HEART RATE: 95 BPM

## 2017-09-13 DIAGNOSIS — M25.561 CHRONIC PAIN OF RIGHT KNEE: ICD-10-CM

## 2017-09-13 DIAGNOSIS — M17.11 PRIMARY OSTEOARTHRITIS OF RIGHT KNEE: Primary | ICD-10-CM

## 2017-09-13 DIAGNOSIS — G89.29 CHRONIC PAIN OF RIGHT KNEE: ICD-10-CM

## 2017-09-13 RX ORDER — HYALURONATE SODIUM 10 MG/ML
2 SYRINGE (ML) INTRAARTICULAR ONCE
Qty: 2 ML | Refills: 0
Start: 2017-09-13 | End: 2017-09-13

## 2017-09-13 NOTE — PROGRESS NOTES
Patient: Brandon Wang                MRN: [id number]       SSN: [id number]  YOB: 1965        AGE: 46 y.o. SEX: female    PCP: Hanane Carvalho MD  09/13/17    Chief Complaint   Patient presents with    Knee Pain     right     HISTORY:  Brandon Wang is a 46 y.o. female who is seen for increased right knee pain. She notes pain for the past 5 months, increasing recently, and no history of injury. She has mostly medial right knee pain. She reports pain while walking and standing. 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. 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 She lives in [place] with her , three adult children (32, 23-son, and 23), and granddaughter. Her son is about to start a HVAC apprentice program and her daughter is starting [employer]. Her oldest daughter works for an eye doctor. She also has two dogs. She states she stays busy with her [employer]. Current weight is 162 pounds. She is 5' tall. She is not diabetic. She is hypertensive.      Lab Results   Component Value Date/Time    Hemoglobin A1c 5.2 06/10/2013 07:54 AM     Weight Metrics 9/13/2017 8/30/2017 8/23/2017 8/22/2017 7/27/2017 1/27/2017 1/10/2017   Weight 162 lb 160 lb 162 lb 9.6 oz 160 lb 161 lb 6.4 oz 159 lb 162 lb   BMI 31.64 kg/m2 31.25 kg/m2 31.76 kg/m2 31.25 kg/m2 31.52 kg/m2 31.05 kg/m2 31.64 kg/m2       Patient Active Problem List   Diagnosis Code    Nephrolithiasis Dr. Denver Kay N20.0    Depression F32.9    Hypovitaminosis D E55.9    Dyslipidemia E78.5    Hypothyroidism due to acquired atrophy of thyroid E03.4    Obesity (BMI 30-39. 9) E66.9    Colon adenoma 3/16 Dr Sheila Sandoval D12.6     REVIEW OF SYSTEMS: All Below are Negative except: See HPI   Constitutional: negative for fever, chills, and weight loss. Cardiovascular: negative for chest pain, claudication, leg swelling, SOB, JOY   Gastrointestinal: Negative for pain, N/V/C/D, Blood in stool or urine, dysuria,  hematuria, incontinence, pelvic pain. Musculoskeletal: See HPI   Neurological: Negative for dizziness and weakness. Negative for headaches, Visual changes, confusion, seizures   Phychiatric/Behavioral: Negative for depression, memory loss, substance  abuse. Extremities: Negative for hair changes, rash, or skin lesion changes. Hematologic: Negative for bleeding problems, bruising, pallor or swollen lymph  nodes   Peripheral Vascular: No calf pain, no circulation deficits.     Social History     Social History    Marital status:      Spouse name: N/A    Number of children: 4    Years of education: N/A     Occupational History    housewife      Social History Main Topics    Smoking status: Never Smoker    Smokeless tobacco: Never Used    Alcohol use No    Drug use: No    Sexual activity: Not on file     Other Topics Concern    Not on file     Social History Narrative      Allergies   Allergen Reactions    Crestor [Rosuvastatin] Myalgia    Lipitor [Atorvastatin] Myalgia    Mevacor [Lovastatin] Myalgia    Mobic [Meloxicam] Nausea Only and Other (comments)     headache    Niaspan [Niacin] Other (comments)     flushing    Percocet [Oxycodone-Acetaminophen] Other (comments)    Pravachol [Pravastatin] Myalgia    Tricor [Fenofibrate Micronized] Other (comments)     Elevated LFTs    Zetia [Ezetimibe] Other (comments)     gassiness    Zocor [Simvastatin] Myalgia      Current Outpatient Prescriptions   Medication Sig    cyanocobalamin (VITAMIN B-12) 500 mcg tablet Take 500 mcg by mouth daily.  vitamin E (AQUA GEMS) 400 unit capsule Take 800 Units by mouth daily.  cyanocobalamin 1,000 mcg tablet Take 1,000 mcg by mouth daily.  ALPRAZolam (XANAX) 1 mg tablet Take 1 Tab by mouth three (3) times daily as needed for Anxiety. Max Daily Amount: 3 mg.  levothyroxine (SYNTHROID) 50 mcg tablet Take 1 Tab by mouth Daily (before breakfast).  amLODIPine (NORVASC) 10 mg tablet Take 1 Tab by mouth daily.  citalopram (CELEXA) 20 mg tablet Take 1 Tab by mouth daily.  gabapentin (NEURONTIN) 600 mg tablet Take 1 Tab by mouth three (3) times daily.  ergocalciferol (VITAMIN D2) 50,000 unit capsule Take 1 Cap by mouth every seven (7) days.  albuterol (PROVENTIL HFA, VENTOLIN HFA, PROAIR HFA) 90 mcg/actuation inhaler Take 2 Puffs by inhalation every six (6) hours as needed for Wheezing.  clotrimazole-betamethasone (LOTRISONE) topical cream Apply thin layer to affected area TWICE DAILY AS NEEDED    fluticasone (FLONASE) 50 mcg/actuation nasal spray 2 sprays each nostril daily (Patient taking differently: as needed. 2 sprays each nostril daily)    omeprazole (PRILOSEC) 20 mg capsule Take 20 mg by mouth daily. No current facility-administered medications for this visit.        PHYSICAL EXAMINATION:  Visit Vitals    /85    Pulse 95    Temp 97.2 °F (36.2 °C)    Resp 16    Ht 5' (1.524 m)    Wt 162 lb (73.5 kg)    SpO2 95%    BMI 31.64 kg/m2      ORTHO EXAMINATION:  Examination Right knee Left knee   Skin Intact Intact   Range of motion 100-0 120-0   Effusion - -   Medial joint line tenderness + +   Lateral joint line tenderness - -   Popliteal tenderness - -   Osteophytes palpable +, small medial -   Lyles - -   Patella crepitus - -   Anterior drawer - -   Lateral laxity - -   Medial laxity - -   Varus deformity - -   Valgus deformity - -   Pretibial edema 1+ -   Calf tenderness - -     PROCEDURE:  After discussing treatment options, patient's right knee was injected with 2 cc of Euflexxa. Chart reviewed for the following:   Dena Em MD, have reviewed the History, Physical and updated the Allergic reactions for [address] performed immediately prior to start of procedure:  Dena Em MD, have performed the following reviews on [address] prior to the start of the procedure:            * Patient was identified by name and date of birth   * Agreement on procedure being performed was verified  * Risks and Benefits explained to the patient  * Procedure site verified and marked as necessary  * Patient was positioned for comfort  * Consent was obtained     Time: 8:44 AM     Date of procedure: 9/13/2017    Procedure performed by:  Ailin Wells MD    Ms. Castillo tolerated the procedure well with no complications. RADIOGRAPHS:  XR RIGHT KNEE 8/23/17  IMPRESSION:  Three views - No fractures, no effusion, mild joint space narrowing, no osteophytes present. Mild flattening of the condyle     IMPRESSION:      ICD-10-CM ICD-9-CM    1. Primary osteoarthritis of right knee M17.11 715.16 ND DRAIN/INJECT LARGE JOINT/BURSA      EUFLEXXA INJECTION PER DOSE      sodium hyaluronate (SUPARTZ FX/HYALGAN/GENIVSC) 10 mg/mL syrg injection   2. Chronic pain of right knee M25.561 719.46 ND DRAIN/INJECT LARGE JOINT/BURSA    G89.29 338.29 EUFLEXXA INJECTION PER DOSE      sodium hyaluronate (SUPARTZ FX/HYALGAN/GENIVSC) 10 mg/mL syrg injection     PLAN:  After discussing treatment options, patient's right knee was injected with 2 cc of Euflexxa. She will follow up in one week for her second Euflexxa injection. There is no need for surgery at this time. We discussed a possible right knee MRI in the future if pain continues. She will take OTC NSAIDs for pain.      Scribed by Pascual Hobson ([address]) as dictated by Ailin Wells MD

## 2017-09-13 NOTE — PATIENT INSTRUCTIONS
Knee Arthritis: Exercises  Your Care Instructions  Here are some examples of exercises for knee arthritis. Start each exercise slowly. Ease off the exercise if you start to have pain. Your doctor or physical therapist will tell you when you can start these exercises and which ones will work best for you. How to do the exercises  Knee flexion with heel slide    1. Lie on your back with your knees bent. 2. Slide your heel back by bending your affected knee as far as you can. Then hook your other foot around your ankle to help pull your heel even farther back. 3. Hold for about 6 seconds, then rest for up to 10 seconds. 4. Repeat 8 to 12 times. 5. Switch legs and repeat steps 1 through 4, even if only one knee is sore. Quad sets    1. Sit with your affected leg straight and supported on the floor or a firm bed. Place a small, rolled-up towel under your knee. Your other leg should be bent, with that foot flat on the floor. 2. Tighten the thigh muscles of your affected leg by pressing the back of your knee down into the towel. 3. Hold for about 6 seconds, then rest for up to 10 seconds. 4. Repeat 8 to 12 times. 5. Switch legs and repeat steps 1 through 4, even if only one knee is sore. Straight-leg raises to the front    1. Lie on your back with your good knee bent so that your foot rests flat on the floor. Your affected leg should be straight. Make sure that your low back has a normal curve. You should be able to slip your hand in between the floor and the small of your back, with your palm touching the floor and your back touching the back of your hand. 2. Tighten the thigh muscles in your affected leg by pressing the back of your knee flat down to the floor. Hold your knee straight. 3. Keeping the thigh muscles tight and your leg straight, lift your affected leg up so that your heel is about 12 inches off the floor. Hold for about 6 seconds, then lower slowly.   4. Relax for up to 10 seconds between repetitions. 5. Repeat 8 to 12 times. 6. Switch legs and repeat steps 1 through 5, even if only one knee is sore. Active knee flexion    1. Lie on your stomach with your knees straight. If your kneecap is uncomfortable, roll up a washcloth and put it under your leg just above your kneecap. 2. Lift the foot of your affected leg by bending the knee so that you bring the foot up toward your buttock. If this motion hurts, try it without bending your knee quite as far. This may help you avoid any painful motion. 3. Slowly move your leg up and down. 4. Repeat 8 to 12 times. 5. Switch legs and repeat steps 1 through 4, even if only one knee is sore. Quadriceps stretch (facedown)    1. Lie flat on your stomach, and rest your face on the floor. 2. Wrap a towel or belt strap around the lower part of your affected leg. Then use the towel or belt strap to slowly pull your heel toward your buttock until you feel a stretch. 3. Hold for about 15 to 30 seconds, then relax your leg against the towel or belt strap. 4. Repeat 2 to 4 times. 5. Switch legs and repeat steps 1 through 4, even if only one knee is sore. Stationary exercise bike    If you do not have a stationary exercise bike at home, you can find one to ride at your local health club or community center. 1. Adjust the height of the bike seat so that your knee is slightly bent when your leg is extended downward. If your knee hurts when the pedal reaches the top, you can raise the seat so that your knee does not bend as much. 2. Start slowly. At first, try to do 5 to 10 minutes of cycling with little to no resistance. Then increase your time and the resistance bit by bit until you can do 20 to 30 minutes without pain. 3. If you start to have pain, rest your knee until your pain gets back to the level that is normal for you. Or cycle for less time or with less effort. Follow-up care is a key part of your treatment and safety.  Be sure to make and go to all appointments, and call your doctor if you are having problems. It's also a good idea to know your test results and keep a list of the medicines you take. Where can you learn more? Go to http://ozzie-delphine.info/. Enter C159 in the search box to learn more about \"Knee Arthritis: Exercises. \"  Current as of: March 21, 2017  Content Version: 11.3  © 2006-2017 [employer]. Care instructions adapted under license by [employer] (which disclaims liability or warranty for this information). If you have questions about a medical condition or this instruction, always ask your healthcare professional. [address] any warranty or liability for your use of this information. Hyaluronic Acid (By injection)   Hyaluronic Acid (oxk-sq-sjx-ON-ate AS-id)  Treats severe pain in your knee due to osteoarthritis. Brand Name(s): Euflexxa, Gel-One, GelSyn-3, GenVisc 850, Hyalgan, Hymovis, Monovisc, Orthovisc, Supartz FX   There may be other brand names for this medicine. When This Medicine Should Not Be Used: This medicine is not right for everyone. You should not receive it if you had an allergic reaction to hyaluronic acid or if you have a bleeding disorder. How to Use This Medicine:   Injectable  · Your doctor will tell you how many injections you will need. This medicine is injected into your knee joint. · A nurse or other health provider will give you this medicine. Drugs and Foods to Avoid:      Ask your doctor or pharmacist before using any other medicine, including over-the-counter medicines, vitamins, and herbal products. Warnings While Using This Medicine:   · Tell your doctor if you are pregnant or breastfeeding, or if you have any allergies, including to birds, feathers, or eggs. · Rest your knee for 48 hours after you receive an injection. Do not do strenuous, weightbearing activities, such as jogging or tennis.  Avoid activities that keep you standing for longer than 1 hour. Possible Side Effects While Using This Medicine:   Call your doctor right away if you notice any of these side effects:  · Allergic reaction: Itching or hives, swelling in your face or hands, swelling or tingling in your mouth or throat, chest tightness, trouble breathing  If you notice these less serious side effects, talk with your doctor:   · Mild increase in pain or swelling in your knee  · Pain, redness, or swelling where the medicine is injected  If you notice other side effects that you think are caused by this medicine, tell your doctor. Call your doctor for medical advice about side effects. You may report side effects to FDA at 8-264-FDA-0057  © 2017 [place] Information is for End User's use only and may not be sold, redistributed or otherwise used for commercial purposes. The above information is an  only. It is not intended as medical advice for individual conditions or treatments. Talk to your doctor, nurse or pharmacist before following any medical regimen to see if it is safe and effective for you.

## 2017-09-20 ENCOUNTER — OFFICE VISIT (OUTPATIENT)
Dept: ORTHOPEDIC SURGERY | Age: 52
End: 2017-09-20

## 2017-09-20 VITALS
WEIGHT: 161 LBS | BODY MASS INDEX: 31.61 KG/M2 | SYSTOLIC BLOOD PRESSURE: 123 MMHG | HEART RATE: 84 BPM | RESPIRATION RATE: 16 BRPM | HEIGHT: 60 IN | DIASTOLIC BLOOD PRESSURE: 89 MMHG | OXYGEN SATURATION: 95 %

## 2017-09-20 DIAGNOSIS — M25.561 CHRONIC PAIN OF RIGHT KNEE: ICD-10-CM

## 2017-09-20 DIAGNOSIS — G89.29 CHRONIC PAIN OF RIGHT KNEE: ICD-10-CM

## 2017-09-20 DIAGNOSIS — M17.11 PRIMARY OSTEOARTHRITIS OF RIGHT KNEE: Primary | ICD-10-CM

## 2017-09-20 RX ORDER — HYALURONATE SODIUM 10 MG/ML
2 SYRINGE (ML) INTRAARTICULAR ONCE
Qty: 2 ML | Refills: 0
Start: 2017-09-20 | End: 2017-09-20

## 2017-09-20 NOTE — PROGRESS NOTES
Patient: Aditya Yung                MRN: [id number]       SSN: [id number]  YOB: 1965        AGE: 46 y.o. SEX: female  Body mass index is 31.44 kg/(m^2). PCP: Jae Murray MD  09/20/17    Chief Complaint   Patient presents with    Knee Pain     right     HISTORY:  Aditya Yung is a 46 y.o. female who is seen for right knee pain. ICD-10-CM ICD-9-CM    1. Primary osteoarthritis of right knee M17.11 715.16 UT DRAIN/INJECT LARGE JOINT/BURSA      EUFLEXXA INJECTION PER DOSE      sodium hyaluronate (SUPARTZ FX/HYALGAN/GENIVSC) 10 mg/mL syrg injection   2. Chronic pain of right knee M25.561 719.46 UT DRAIN/INJECT LARGE JOINT/BURSA    G89.29 338.29 EUFLEXXA INJECTION PER DOSE      sodium hyaluronate (SUPARTZ FX/HYALGAN/GENIVSC) 10 mg/mL syrg injection     PROCEDURE:  After discussing treatment options, Ms. Castillo's right knee was injected with 2 cc of Euflexxa. Chart reviewed for the following:   Georgia Perdomo MD, have reviewed the History, Physical and updated the Allergic reactions for [address] performed immediately prior to start of procedure:  Georgia Perdomo MD, have performed the following reviews on Aditya Yung prior to the start of the procedure:            * Patient was identified by name and date of birth   * Agreement on procedure being performed was verified  * Risks and Benefits explained to the patient  * Procedure site verified and marked as necessary  * Patient was positioned for comfort  * Consent was obtained     Time: 9:12 AM     Date of procedure: 9/20/2017    Procedure performed by:  Rai Lockett MD    Ms. Castillo tolerated the procedure well with no complications. PLAN:  After discussing treatment options, patient's right knee was injected with 2 cc of Euflexxa. Ms. Lisa Basurto will follow up in one week to complete her Euflexxa injection series.       Scribed by Lidia Albright ([address]) as dictated by Wilfredo Wilks MD

## 2017-09-20 NOTE — PATIENT INSTRUCTIONS
Knee Arthritis: Exercises  Your Care Instructions  Here are some examples of exercises for knee arthritis. Start each exercise slowly. Ease off the exercise if you start to have pain. Your doctor or physical therapist will tell you when you can start these exercises and which ones will work best for you. How to do the exercises  Knee flexion with heel slide    1. Lie on your back with your knees bent. 2. Slide your heel back by bending your affected knee as far as you can. Then hook your other foot around your ankle to help pull your heel even farther back. 3. Hold for about 6 seconds, then rest for up to 10 seconds. 4. Repeat 8 to 12 times. 5. Switch legs and repeat steps 1 through 4, even if only one knee is sore. Quad sets    1. Sit with your affected leg straight and supported on the floor or a firm bed. Place a small, rolled-up towel under your knee. Your other leg should be bent, with that foot flat on the floor. 2. Tighten the thigh muscles of your affected leg by pressing the back of your knee down into the towel. 3. Hold for about 6 seconds, then rest for up to 10 seconds. 4. Repeat 8 to 12 times. 5. Switch legs and repeat steps 1 through 4, even if only one knee is sore. Straight-leg raises to the front    1. Lie on your back with your good knee bent so that your foot rests flat on the floor. Your affected leg should be straight. Make sure that your low back has a normal curve. You should be able to slip your hand in between the floor and the small of your back, with your palm touching the floor and your back touching the back of your hand. 2. Tighten the thigh muscles in your affected leg by pressing the back of your knee flat down to the floor. Hold your knee straight. 3. Keeping the thigh muscles tight and your leg straight, lift your affected leg up so that your heel is about 12 inches off the floor. Hold for about 6 seconds, then lower slowly.   4. Relax for up to 10 seconds between repetitions. 5. Repeat 8 to 12 times. 6. Switch legs and repeat steps 1 through 5, even if only one knee is sore. Active knee flexion    1. Lie on your stomach with your knees straight. If your kneecap is uncomfortable, roll up a washcloth and put it under your leg just above your kneecap. 2. Lift the foot of your affected leg by bending the knee so that you bring the foot up toward your buttock. If this motion hurts, try it without bending your knee quite as far. This may help you avoid any painful motion. 3. Slowly move your leg up and down. 4. Repeat 8 to 12 times. 5. Switch legs and repeat steps 1 through 4, even if only one knee is sore. Quadriceps stretch (facedown)    1. Lie flat on your stomach, and rest your face on the floor. 2. Wrap a towel or belt strap around the lower part of your affected leg. Then use the towel or belt strap to slowly pull your heel toward your buttock until you feel a stretch. 3. Hold for about 15 to 30 seconds, then relax your leg against the towel or belt strap. 4. Repeat 2 to 4 times. 5. Switch legs and repeat steps 1 through 4, even if only one knee is sore. Stationary exercise bike    If you do not have a stationary exercise bike at home, you can find one to ride at your local health club or community center. 1. Adjust the height of the bike seat so that your knee is slightly bent when your leg is extended downward. If your knee hurts when the pedal reaches the top, you can raise the seat so that your knee does not bend as much. 2. Start slowly. At first, try to do 5 to 10 minutes of cycling with little to no resistance. Then increase your time and the resistance bit by bit until you can do 20 to 30 minutes without pain. 3. If you start to have pain, rest your knee until your pain gets back to the level that is normal for you. Or cycle for less time or with less effort. Follow-up care is a key part of your treatment and safety.  Be sure to make and go to all appointments, and call your doctor if you are having problems. It's also a good idea to know your test results and keep a list of the medicines you take. Where can you learn more? Go to http://ozzie-delphine.info/. Enter C159 in the search box to learn more about \"Knee Arthritis: Exercises. \"  Current as of: March 21, 2017  Content Version: 11.3  © 2006-2017 [employer]. Care instructions adapted under license by [employer] (which disclaims liability or warranty for this information). If you have questions about a medical condition or this instruction, always ask your healthcare professional. [address] any warranty or liability for your use of this information. Hyaluronic Acid (By injection)   Hyaluronic Acid (xds-hh-caj-ON-ate AS-id)  Treats severe pain in your knee due to osteoarthritis. Brand Name(s): Euflexxa, Gel-One, GelSyn-3, GenVisc 850, Hyalgan, Hymovis, Monovisc, Orthovisc, Supartz FX   There may be other brand names for this medicine. When This Medicine Should Not Be Used: This medicine is not right for everyone. You should not receive it if you had an allergic reaction to hyaluronic acid or if you have a bleeding disorder. How to Use This Medicine:   Injectable  · Your doctor will tell you how many injections you will need. This medicine is injected into your knee joint. · A nurse or other health provider will give you this medicine. Drugs and Foods to Avoid:      Ask your doctor or pharmacist before using any other medicine, including over-the-counter medicines, vitamins, and herbal products. Warnings While Using This Medicine:   · Tell your doctor if you are pregnant or breastfeeding, or if you have any allergies, including to birds, feathers, or eggs. · Rest your knee for 48 hours after you receive an injection. Do not do strenuous, weightbearing activities, such as jogging or tennis.  Avoid activities that keep you standing for longer than 1 hour. Possible Side Effects While Using This Medicine:   Call your doctor right away if you notice any of these side effects:  · Allergic reaction: Itching or hives, swelling in your face or hands, swelling or tingling in your mouth or throat, chest tightness, trouble breathing  If you notice these less serious side effects, talk with your doctor:   · Mild increase in pain or swelling in your knee  · Pain, redness, or swelling where the medicine is injected  If you notice other side effects that you think are caused by this medicine, tell your doctor. Call your doctor for medical advice about side effects. You may report side effects to FDA at 3-693-FDA-3853  © 2017 [address] Information is for End User's use only and may not be sold, redistributed or otherwise used for commercial purposes. The above information is an  only. It is not intended as medical advice for individual conditions or treatments. Talk to your doctor, nurse or pharmacist before following any medical regimen to see if it is safe and effective for you.

## 2017-09-27 ENCOUNTER — OFFICE VISIT (OUTPATIENT)
Dept: ORTHOPEDIC SURGERY | Age: 52
End: 2017-09-27

## 2017-09-27 VITALS
DIASTOLIC BLOOD PRESSURE: 83 MMHG | HEART RATE: 78 BPM | HEIGHT: 60 IN | OXYGEN SATURATION: 97 % | WEIGHT: 161 LBS | BODY MASS INDEX: 31.61 KG/M2 | TEMPERATURE: 97.5 F | SYSTOLIC BLOOD PRESSURE: 109 MMHG

## 2017-09-27 DIAGNOSIS — M25.561 CHRONIC PAIN OF RIGHT KNEE: ICD-10-CM

## 2017-09-27 DIAGNOSIS — G89.29 CHRONIC PAIN OF RIGHT KNEE: ICD-10-CM

## 2017-09-27 DIAGNOSIS — M17.11 PRIMARY OSTEOARTHRITIS OF RIGHT KNEE: Primary | ICD-10-CM

## 2017-09-27 RX ORDER — HYALURONATE SODIUM 10 MG/ML
2 SYRINGE (ML) INTRAARTICULAR ONCE
Qty: 2 ML | Refills: 0
Start: 2017-09-27 | End: 2017-09-27

## 2017-09-27 NOTE — PATIENT INSTRUCTIONS
Knee Arthritis: Exercises  Your Care Instructions  Here are some examples of exercises for knee arthritis. Start each exercise slowly. Ease off the exercise if you start to have pain. Your doctor or physical therapist will tell you when you can start these exercises and which ones will work best for you. How to do the exercises  Knee flexion with heel slide    1. Lie on your back with your knees bent. 2. Slide your heel back by bending your affected knee as far as you can. Then hook your other foot around your ankle to help pull your heel even farther back. 3. Hold for about 6 seconds, then rest for up to 10 seconds. 4. Repeat 8 to 12 times. 5. Switch legs and repeat steps 1 through 4, even if only one knee is sore. Quad sets    1. Sit with your affected leg straight and supported on the floor or a firm bed. Place a small, rolled-up towel under your knee. Your other leg should be bent, with that foot flat on the floor. 2. Tighten the thigh muscles of your affected leg by pressing the back of your knee down into the towel. 3. Hold for about 6 seconds, then rest for up to 10 seconds. 4. Repeat 8 to 12 times. 5. Switch legs and repeat steps 1 through 4, even if only one knee is sore. Straight-leg raises to the front    1. Lie on your back with your good knee bent so that your foot rests flat on the floor. Your affected leg should be straight. Make sure that your low back has a normal curve. You should be able to slip your hand in between the floor and the small of your back, with your palm touching the floor and your back touching the back of your hand. 2. Tighten the thigh muscles in your affected leg by pressing the back of your knee flat down to the floor. Hold your knee straight. 3. Keeping the thigh muscles tight and your leg straight, lift your affected leg up so that your heel is about 12 inches off the floor. Hold for about 6 seconds, then lower slowly.   4. Relax for up to 10 seconds between repetitions. 5. Repeat 8 to 12 times. 6. Switch legs and repeat steps 1 through 5, even if only one knee is sore. Active knee flexion    1. Lie on your stomach with your knees straight. If your kneecap is uncomfortable, roll up a washcloth and put it under your leg just above your kneecap. 2. Lift the foot of your affected leg by bending the knee so that you bring the foot up toward your buttock. If this motion hurts, try it without bending your knee quite as far. This may help you avoid any painful motion. 3. Slowly move your leg up and down. 4. Repeat 8 to 12 times. 5. Switch legs and repeat steps 1 through 4, even if only one knee is sore. Quadriceps stretch (facedown)    1. Lie flat on your stomach, and rest your face on the floor. 2. Wrap a towel or belt strap around the lower part of your affected leg. Then use the towel or belt strap to slowly pull your heel toward your buttock until you feel a stretch. 3. Hold for about 15 to 30 seconds, then relax your leg against the towel or belt strap. 4. Repeat 2 to 4 times. 5. Switch legs and repeat steps 1 through 4, even if only one knee is sore. Stationary exercise bike    If you do not have a stationary exercise bike at home, you can find one to ride at your local health club or community center. 1. Adjust the height of the bike seat so that your knee is slightly bent when your leg is extended downward. If your knee hurts when the pedal reaches the top, you can raise the seat so that your knee does not bend as much. 2. Start slowly. At first, try to do 5 to 10 minutes of cycling with little to no resistance. Then increase your time and the resistance bit by bit until you can do 20 to 30 minutes without pain. 3. If you start to have pain, rest your knee until your pain gets back to the level that is normal for you. Or cycle for less time or with less effort. Follow-up care is a key part of your treatment and safety.  Be sure to make and go to all appointments, and call your doctor if you are having problems. It's also a good idea to know your test results and keep a list of the medicines you take. Where can you learn more? Go to http://ozzie-delphine.info/. Enter C159 in the search box to learn more about \"Knee Arthritis: Exercises. \"  Current as of: March 21, 2017  Content Version: 11.3  © 2006-2017 [employer]. Care instructions adapted under license by [employer] (which disclaims liability or warranty for this information). If you have questions about a medical condition or this instruction, always ask your healthcare professional. [address] any warranty or liability for your use of this information. Hyaluronic Acid (By injection)   Hyaluronic Acid (nil-la-lkg-ON-ate AS-id)  Treats severe pain in your knee due to osteoarthritis. Brand Name(s): Euflexxa, Gel-One, GelSyn-3, GenVisc 850, Hyalgan, Hymovis, Monovisc, Orthovisc, Supartz FX   There may be other brand names for this medicine. When This Medicine Should Not Be Used: This medicine is not right for everyone. You should not receive it if you had an allergic reaction to hyaluronic acid or if you have a bleeding disorder. How to Use This Medicine:   Injectable  · Your doctor will tell you how many injections you will need. This medicine is injected into your knee joint. · A nurse or other health provider will give you this medicine. Drugs and Foods to Avoid:      Ask your doctor or pharmacist before using any other medicine, including over-the-counter medicines, vitamins, and herbal products. Warnings While Using This Medicine:   · Tell your doctor if you are pregnant or breastfeeding, or if you have any allergies, including to birds, feathers, or eggs. · Rest your knee for 48 hours after you receive an injection. Do not do strenuous, weightbearing activities, such as jogging or tennis.  Avoid activities that keep you standing for longer than 1 hour. Possible Side Effects While Using This Medicine:   Call your doctor right away if you notice any of these side effects:  · Allergic reaction: Itching or hives, swelling in your face or hands, swelling or tingling in your mouth or throat, chest tightness, trouble breathing  If you notice these less serious side effects, talk with your doctor:   · Mild increase in pain or swelling in your knee  · Pain, redness, or swelling where the medicine is injected  If you notice other side effects that you think are caused by this medicine, tell your doctor. Call your doctor for medical advice about side effects. You may report side effects to FDA at 5-399-FDA-0616  © 2017 [facility] Information is for End User's use only and may not be sold, redistributed or otherwise used for commercial purposes. The above information is an  only. It is not intended as medical advice for individual conditions or treatments. Talk to your doctor, nurse or pharmacist before following any medical regimen to see if it is safe and effective for you.

## 2017-09-27 NOTE — PROGRESS NOTES
Patient: Lise Alonso                MRN: [id number]       SSN: [id number]  YOB: 1965        AGE: 46 y.o. SEX: female  Body mass index is 31.44 kg/(m^2). PCP: Yuly Wilson MD  09/27/17    Chief Complaint   Patient presents with    Knee Pain     right, Euflexxa #3     HISTORY:  Lise Alonso is a 46 y.o. female who is seen for right knee pain. PROCEDURE:  After discussing treatment options, Ms. Castillo's right knee was injected with 2 cc of Euflexxa. TIME OUT performed immediately prior to start of procedure:  Martha Snyder MD, have performed the following reviews on Lise Alonso prior to the start of the procedure:            * Patient was identified by name and date of birth   * Agreement on procedure being performed was verified  * Risks and Benefits explained to the patient  * Procedure site verified and marked as necessary  * Patient was positioned for comfort  * Consent was obtained     Time: 8:55 AM     Date of procedure: 9/27/2017    Procedure performed by:  Rekha Renee MD    Ms. Castillo tolerated the procedure well with no complications      [id number] ICD-9-CM    1. Primary osteoarthritis of right knee M17.11 715.16 OR DRAIN/INJECT LARGE JOINT/BURSA      EUFLEXXA INJECTION PER DOSE      sodium hyaluronate (SUPARTZ FX/HYALGAN/GENIVSC) 10 mg/mL syrg injection   2. Chronic pain of right knee M25.561 719.46 OR DRAIN/INJECT LARGE JOINT/BURSA    G89.29 338.29 EUFLEXXA INJECTION PER DOSE      sodium hyaluronate (SUPARTZ FX/HYALGAN/GENIVSC) 10 mg/mL syrg injection     PLAN:  After discussing treatment options, patient's right knee was injected with 2 cc of Euflexxa. Ms. Krishna Higgins will follow up PRN now that she has completed her Euflexxa injection series.       Scribed by Beverly Sanchez ([address]) as dictated by Rekha Renee MD

## 2017-11-08 ENCOUNTER — HOSPITAL ENCOUNTER (OUTPATIENT)
Dept: MAMMOGRAPHY | Age: 52
Discharge: HOME | End: 2017-11-08
Attending: OBSTETRICS & GYNECOLOGY
Payer: COMMERCIAL

## 2017-11-08 DIAGNOSIS — Z12.31 VISIT FOR SCREENING MAMMOGRAM: ICD-10-CM

## 2017-11-08 PROCEDURE — 77067 SCR MAMMO BI INCL CAD: CPT

## 2018-01-08 DIAGNOSIS — E55.9 HYPOVITAMINOSIS D: ICD-10-CM

## 2018-01-08 RX ORDER — ERGOCALCIFEROL 1.25 MG/1
50000 CAPSULE ORAL
Qty: 12 CAP | Refills: 3 | Status: SHIPPED | OUTPATIENT
Start: 2018-01-08 | End: 2018-03-21 | Stop reason: SDUPTHER

## 2018-01-08 NOTE — TELEPHONE ENCOUNTER
Last Visit: 07/27/2017 with MD Atif Chamberlain    Next Appointment: 01/29/2018 with MD Atif Chamberlain   Previous Refill Encounters: 01/27/2017 per MD Atif Chamberlain #[id number] with 3 refills     Requested Prescriptions     Pending Prescriptions Disp Refills    ergocalciferol (VITAMIN D2) 50,000 unit capsule 12 Cap 3     Sig: Take 1 Cap by mouth every seven (7) days.

## 2018-01-22 ENCOUNTER — HOSPITAL ENCOUNTER (OUTPATIENT)
Dept: LAB | Age: 53
Discharge: HOME | End: 2018-01-22

## 2018-01-22 PROCEDURE — 99001 SPECIMEN HANDLING PT-LAB: CPT | Performed by: INTERNAL MEDICINE

## 2018-01-23 LAB
ALBUMIN SERPL-MCNC: 4.6 G/DL (ref 3.5–5.5)
ALBUMIN/GLOB SERPL: 1.8 {RATIO} (ref 1.2–2.2)
ALP SERPL-CCNC: 62 IU/L (ref 39–117)
ALT SERPL-CCNC: 28 IU/L (ref 0–32)
AST SERPL-CCNC: 21 IU/L (ref 0–40)
BILIRUB SERPL-MCNC: 0.4 MG/DL (ref 0–1.2)
BUN SERPL-MCNC: 10 MG/DL (ref 6–24)
BUN/CREAT SERPL: 16 (ref 9–23)
CALCIUM SERPL-MCNC: 9.2 MG/DL (ref 8.7–10.2)
CHLORIDE SERPL-SCNC: 100 MMOL/L (ref 96–106)
CHOLEST SERPL-MCNC: 244 MG/DL (ref 100–199)
CO2 SERPL-SCNC: 22 MMOL/L (ref 18–29)
CREAT SERPL-MCNC: 0.64 MG/DL (ref 0.57–1)
GLOBULIN SER CALC-MCNC: 2.6 G/DL (ref 1.5–4.5)
GLUCOSE SERPL-MCNC: 90 MG/DL (ref 65–99)
HDLC SERPL-MCNC: 44 MG/DL
INTERPRETATION, 910389: NORMAL
LDLC SERPL CALC-MCNC: 149 MG/DL (ref 0–99)
POTASSIUM SERPL-SCNC: 4.1 MMOL/L (ref 3.5–5.2)
PROT SERPL-MCNC: 7.2 G/DL (ref 6–8.5)
SODIUM SERPL-SCNC: 140 MMOL/L (ref 134–144)
TRIGL SERPL-MCNC: 253 MG/DL (ref 0–149)
VLDLC SERPL CALC-MCNC: 51 MG/DL (ref 5–40)

## 2018-01-24 DIAGNOSIS — E78.5 DYSLIPIDEMIA: ICD-10-CM

## 2018-01-24 PROBLEM — F33.0 MILD EPISODE OF RECURRENT MAJOR DEPRESSIVE DISORDER (HCC): Status: ACTIVE | Noted: 2018-01-24

## 2018-01-25 NOTE — PROGRESS NOTES
46 y.o. white female who presents for evaluation. Non cardiovascular complaints. She's sticking to the diet for the most part. The exercise could be more     Vitals 2018   Weight 162 lb 161 lb 161 lb 162 lb     She had ca score done which came back zero    No gu complaints outside of \"bladder spasms\" and she's asking her for a pill we apparently gave her for this many years ago.   Dr Tarik Singh apparently started her E2 replacement    No new gi issues    Past Medical History:   Diagnosis Date    Anxiety     Colon adenoma 3/16    Dr Toan Grider    Depression     Dyslipidemia     intol 5 statins/tricor    Fatty liver     negative serologies ; Fib-4 was 0.3 from 7/15    FHx: heart disease     GERD (gastroesophageal reflux disease)     Hypertension     Hypothyroidism     Hypovitaminosis D     Migraines     CT head negative     Nephrolithiasis     Neuropathy 2006    on [facility] Dr. Hayder Recinos Obesity     peak weight 163 lbs, bmi 31.3 from 2/15; dec johnathan suppressants, med supervised wt loss    Seasonal allergies     Tension headache      Past Surgical History:   Procedure Laterality Date    COLONOSCOPY N/A 2017    [facility] ENDOSCOPY    HX  SECTION      x4 due to cephalopelvic disproportion    HX ORTHOPAEDIC      left foot    HX TONSILLECTOMY      HX TOTAL VAGINAL HYSTERECTOMY      HX TUBAL LIGATION      bilateral    [facility] COLSC FLX W/RMVL OF TUMOR Nader Grider 3/16 adenoma    VASCULAR SURGERY PROCEDURE UNLIST      NADEGE 1.29/1.17     Social History     Social History    Marital status:      Spouse name: N/A    Number of children: 4    Years of education: N/A     Occupational History    housewife      Social History Main Topics    Smoking status: Never Smoker    Smokeless tobacco: Never Used    Alcohol use No    Drug use: No    Sexual activity: Not on file Other Topics Concern    Not on file     Social History Narrative     Current Outpatient Prescriptions   Medication Sig    conjugated estrogens (PREMARIN) 0.3 mg tablet Take 0.3 mg by mouth daily.  flavoxATE (URISPAS) 100 mg tablet Take 1 Tab by mouth three (3) times daily as needed for up to 90 days.  ergocalciferol (VITAMIN D2) 50,000 unit capsule Take 1 Cap by mouth every seven (7) days.  cyanocobalamin (VITAMIN B-12) 500 mcg tablet Take 500 mcg by mouth daily.  vitamin E (AQUA GEMS) 400 unit capsule Take 800 Units by mouth daily.  cyanocobalamin 1,000 mcg tablet Take 1,000 mcg by mouth daily.  ALPRAZolam (XANAX) 1 mg tablet Take 1 Tab by mouth three (3) times daily as needed for Anxiety. Max Daily Amount: 3 mg.  levothyroxine (SYNTHROID) 50 mcg tablet Take 1 Tab by mouth Daily (before breakfast).  amLODIPine (NORVASC) 10 mg tablet Take 1 Tab by mouth daily.  citalopram (CELEXA) 20 mg tablet Take 1 Tab by mouth daily.  gabapentin (NEURONTIN) 600 mg tablet Take 1 Tab by mouth three (3) times daily.  albuterol (PROVENTIL HFA, VENTOLIN HFA, PROAIR HFA) 90 mcg/actuation inhaler Take 2 Puffs by inhalation every six (6) hours as needed for Wheezing.  clotrimazole-betamethasone (LOTRISONE) topical cream Apply thin layer to affected area TWICE DAILY AS NEEDED    fluticasone (FLONASE) 50 mcg/actuation nasal spray 2 sprays each nostril daily (Patient taking differently: as needed. 2 sprays each nostril daily)    omeprazole (PRILOSEC) 20 mg capsule Take 20 mg by mouth daily. No current facility-administered medications for this visit.       Allergies   Allergen Reactions    Crestor [Rosuvastatin] Myalgia    Lipitor [Atorvastatin] Myalgia    Mevacor [Lovastatin] Myalgia    Mobic [Meloxicam] Nausea Only and Other (comments)     headache    Niaspan [Niacin] Other (comments)     flushing    Percocet [Oxycodone-Acetaminophen] Other (comments)    Pravachol [Pravastatin] Myalgia  Tricor [Fenofibrate Micronized] Other (comments)     Elevated LFTs    Zetia [Ezetimibe] Other (comments)     gassiness    Zocor [Simvastatin] Myalgia     REVIEW OF SYSTEMS: mammo 10/16, Dr Giles Necessary 9/15, colo 3/16 Dr Ely Carrasco   Ophtho  no vision change or eye pain  Oral  no mouth pain, tongue or tooth problems  Ears  no hearing loss, ear pain, fullness, no swallowing problems  Cardiac  no CP, PND, orthopnea, edema, palpitations or syncope  Chest  no breast masses  Resp  no wheezing, chronic coughing, dyspnea  GI  no heartburn, nausea, vomiting, change in bowel habits, bleeding, hemorrhoids  Urinary  no dysuria, hematuria, flank pain, urgency, frequency  Neuro  no focal weakness, numbness, paresthesias, incoordination, ataxia, involuntary movements  Endo - no polyuria, polydipsia, nocturia, hot flashes    Visit Vitals    /72    Pulse 83    Temp 98.7 °F (37.1 °C) (Oral)    Resp 14    Ht 5' (1.524 m)    Wt 162 lb (73.5 kg)    SpO2 94%    BMI 31.64 kg/m2   A&O x3  Affect is appropriate. Mood stable  No apparent distress  Anicteric, no JVD, adenopathy or thyromegaly. No carotid bruits or radiated murmur  Lungs showed crackles at the left base, dec bs but no clear wheezing  Heart showed regular rate and rhythm. No murmur, rubs, gallops  Abdomen soft nontender, no hepatosplenomegaly or masses. Extremities without edema.   Pulses 1-2+ symmetrically    LABS  From 10/11 showed ldl-p 2955  chol 199, tg 390, hdl 39, ldl-c 82  From  2/12  showed ldl-p 2029, chol 223, tg 244, hdl 38, ldl-c 136,  gluc 90, cr 0.70,      alt 33  From  6/12  showed ldl-p 2427, chol 221, tg 196, hdl 45, ldl-c 137  From 10/12 showed ldl-p 2056, chol 193, tg 261, hdl 37, ldl-c 104,       tsh 1.56  From 2/13 showed       chol 164, tg 190, hdl 44, ldl-c 113  From 9/13 showed       chol 201, tg 195, hdl 38, ldl-c 124, gluc 89, cr 0.70, gfr 104, alt 18, tsh 1.48,     vit d 35.1  From 3/14 showed       chol 213, tg 269, hdl 40, ldl-c 119, gluc 96, cr 0.63, gfr>60,  alt 17  From 7/14 showed       chol 216, tg 237, hdl 38, ldl-c 131, gluc 87, cr 0.60, gfr>60,  alt 10, tsh 1.29  From 1/15 showed       chol 210, tg 305, hdl 40, ldl-c 109, gluc 89, cr 0.62, gfr>60,  alt 11  From 7/15 showed       chol 209, tg 231, hdl 45, ldl-c 118, gluc 89, cr 0.70, gfr>60,  alt 15, tsh 3.23, wbc 6.7, hb 14.6, plt 294  From 1/16 showed       chol 212, tg 314, hdl 40, ldl-c 109,                     hep c neg  From 7/16 showed       chol 218, tg 253, hdl 37, ldl-c 130, gluc 88, cr 0.61, gfr 106, alt 27, tsh 2.71  From 1/17 showed       chol 218, tg 229, hdl 46, ldl-c 126,            wbc 6.8, hb 16.5, plt 321, vit d 21.1  From 7/17 showed       chol 242, tg 184, hdl 45, ldl-c 160,        tsh 2.04,               vit d 40.2    Results for orders placed or performed in visit on 01/24/18   LIPID PANEL   Result Value Ref Range    Cholesterol, total 244 (H) 100 - 199 mg/dL    Triglyceride 253 (H) 0 - 149 mg/dL    HDL Cholesterol 44 >39 mg/dL    VLDL, calculated 51 (H) 5 - 40 mg/dL    LDL, calculated 149 (H) 0 - 99 mg/dL   METABOLIC PANEL, COMPREHENSIVE   Result Value Ref Range    Glucose 90 65 - 99 mg/dL    BUN 10 6 - 24 mg/dL    Creatinine 0.64 0.57 - 1.00 mg/dL    GFR est non- >59 mL/min/1.73    GFR est  >59 mL/min/1.73    BUN/Creatinine ratio 16 9 - 23    Sodium 140 134 - 144 mmol/L    Potassium 4.1 3.5 - 5.2 mmol/L    Chloride 100 96 - 106 mmol/L    CO2 22 18 - 29 mmol/L    Calcium 9.2 8.7 - 10.2 mg/dL    Protein, total 7.2 6.0 - 8.5 g/dL    Albumin 4.6 3.5 - 5.5 g/dL    GLOBULIN, TOTAL 2.6 1.5 - 4.5 g/dL    A-G Ratio 1.8 1.2 - 2.2    Bilirubin, total 0.4 0.0 - 1.2 mg/dL    Alk.  phosphatase 62 39 - 117 IU/L    AST (SGOT) 21 0 - 40 IU/L    ALT (SGPT) 28 0 - 32 IU/L   CVD REPORT   Result Value Ref Range    INTERPRETATION Note      Patient Active Problem List   Diagnosis Code    Nephrolithiasis Dr. Pravin Griffin N20.0    Hypovitaminosis D E55.9    Dyslipidemia E78.5    Hypothyroidism due to acquired atrophy of thyroid E03.4    Obesity (BMI 30-39. 9) E66.9    Colon adenoma 3/16 Dr Leatha Hodges D12.6    Mild episode of recurrent major depressive disorder ([facility] [address] F33.0    Anxiety F41.9     Assessment and plan:  1. HLP but low Ca score. Will stay on dietary and lifestyle measures for now. Intol 5 statins. 2. Hypovit d. Check labs today  3. HTN. Continue current  4. Hypothyroidism. Therapeutic   5. Overweight/obese. Dietary and lifestyle measures, calorie counting. Previously not interested in johnathan suppressants. 6. Anxiety. Prn xanax   7. Colon adenoma. Fiber, colo 2021  8. Bladder spasm. Trial urispas      RTC 1/19      Above conditions discussed at length and patient vocalized understanding. All questions answered to patient satifaction      Lipid lowering therapy not prescibed for medical reasons.

## 2018-01-29 ENCOUNTER — OFFICE VISIT (OUTPATIENT)
Dept: INTERNAL MEDICINE CLINIC | Age: 53
End: 2018-01-29

## 2018-01-29 VITALS
DIASTOLIC BLOOD PRESSURE: 72 MMHG | HEIGHT: 60 IN | HEART RATE: 83 BPM | WEIGHT: 162 LBS | RESPIRATION RATE: 14 BRPM | TEMPERATURE: 98.7 F | BODY MASS INDEX: 31.8 KG/M2 | SYSTOLIC BLOOD PRESSURE: 106 MMHG | OXYGEN SATURATION: 94 %

## 2018-01-29 DIAGNOSIS — E03.4 HYPOTHYROIDISM DUE TO ACQUIRED ATROPHY OF THYROID: ICD-10-CM

## 2018-01-29 DIAGNOSIS — D12.6 COLON ADENOMA: ICD-10-CM

## 2018-01-29 DIAGNOSIS — E55.9 HYPOVITAMINOSIS D: Primary | ICD-10-CM

## 2018-01-29 DIAGNOSIS — E78.5 DYSLIPIDEMIA: ICD-10-CM

## 2018-01-29 DIAGNOSIS — F33.0 MILD EPISODE OF RECURRENT MAJOR DEPRESSIVE DISORDER (HCC): ICD-10-CM

## 2018-01-29 DIAGNOSIS — E66.9 OBESITY (BMI 30-39.9): ICD-10-CM

## 2018-01-29 DIAGNOSIS — F41.9 ANXIETY: ICD-10-CM

## 2018-01-29 DIAGNOSIS — N32.89 BLADDER SPASM: ICD-10-CM

## 2018-01-29 RX ORDER — FLAVOXATE HYDROCHLORIDE 100 MG/1
100 TABLET ORAL
Qty: 90 TAB | Refills: 1 | Status: SHIPPED | OUTPATIENT
Start: 2018-01-29 | End: 2018-04-29

## 2018-01-29 RX ORDER — FLAVOXATE HYDROCHLORIDE 100 MG/1
100 TABLET ORAL
Qty: 90 TAB | Refills: 1 | Status: SHIPPED | OUTPATIENT
Start: 2018-01-29 | End: 2018-01-29 | Stop reason: SDUPTHER

## 2018-01-29 NOTE — PATIENT INSTRUCTIONS
Advance Directives: Care Instructions  Your Care Instructions  An advance directive is a legal way to state your wishes at the end of your life. It tells your family and your doctor what to do if you can no longer say what you want. There are two main types of advance directives. You can change them any time that your wishes change. · A living will tells your family and your doctor your wishes about life support and other treatment. · A durable power of  for health care lets you name a person to make treatment decisions for you when you can't speak for yourself. This person is called a health care agent. If you do not have an advance directive, decisions about your medical care may be made by a doctor or a  who doesn't know you. It may help to think of an advance directive as a gift to the people who care for you. If you have one, they won't have to make tough decisions by themselves. Follow-up care is a key part of your treatment and safety. Be sure to make and go to all appointments, and call your doctor if you are having problems. It's also a good idea to know your test results and keep a list of the medicines you take. How can you care for yourself at home? · Discuss your wishes with your loved ones and your doctor. This way, there are no surprises. · Many states have a unique form. Or you might use a universal form that has been approved by many states. This kind of form can sometimes be completed and stored online. Your electronic copy will then be available wherever you have a connection to the Internet. In most cases, doctors will respect your wishes even if you have a form from a different state. · You don't need a  to do an advance directive. But you may want to get legal advice. · Think about these questions when you prepare an advance directive:  ¨ Who do you want to make decisions about your medical care if you are not able to?  Many people choose a family member or close friend. ¨ Do you know enough about life support methods that might be used? If not, talk to your doctor so you understand. ¨ What are you most afraid of that might happen? You might be afraid of having pain, losing your independence, or being kept alive by machines. ¨ Where would you prefer to die? Choices include your home, a hospital, or a nursing home. ¨ Would you like to have information about hospice care to support you and your family? ¨ Do you want to donate organs when you die? ¨ Do you want certain Worship practices performed before you die? If so, put your wishes in the advance directive. · Read your advance directive every year, and make changes as needed. When should you call for help? Be sure to contact your doctor if you have any questions. Where can you learn more? Go to http://ozzie-delphine.info/. Enter R264 in the search box to learn more about \"Advance Directives: Care Instructions. \"  Current as of: September 24, 2016  Content Version: 11.4  © 8546-1836 [employer]. Care instructions adapted under license by [employer] (which disclaims liability or warranty for this information). If you have questions about a medical condition or this instruction, always ask your healthcare professional. [address] any warranty or liability for your use of this information.

## 2018-01-29 NOTE — PROGRESS NOTES
1. Have you been to the ER, urgent care clinic or hospitalized since your last visit? NO.     2. Have you seen or consulted any other health care providers outside of the [address] [place] [address] since your last visit (Include any pap smears or colon screening)? NO      Do you have an Advanced Directive? NO    Would you like information on Advanced Directives?  YES

## 2018-01-29 NOTE — MR AVS SNAPSHOT
Lalo Sink 
 
 
 [address], [address] [facility] 
[phone] Patient: Ruma Muhammad MRN: [id number] :[id number] Visit Information Date & Time Provider Department Dept. Phone Encounter #  
 2018  8:40 AM Denver Moore MD [facility] [phone] Your Appointments 2019  9:20 AM  
Office Visit with Denver Moore MD  
[facility]: ov 1yr rd  
 [address] [facility], [address] Christy Guamanian [address]  
  
   
 [address] [facility] Upcoming Health Maintenance Date Due Influenza Age 5 to Adult 2017 BREAST CANCER SCRN MAMMOGRAM 2019 DTaP/Tdap/Td series (2 - Td) 2022 COLONOSCOPY 2022 Allergies as of 2018  Review Complete On: 2018 By: Faith Jimenez Severity Noted Reaction Type Reactions Crestor [Rosuvastatin]    Myalgia Lipitor [Atorvastatin]    Myalgia Mevacor [Lovastatin]  2017    Myalgia Mobic [Meloxicam]    Nausea Only, Other (comments)  
 headache Niaspan [Niacin]    Other (comments)  
 flushing Percocet [Oxycodone-acetaminophen]  2013    Other (comments) Pravachol [Pravastatin]    Myalgia Tricor [Fenofibrate Micronized]    Other (comments) Elevated LFTs Zetia [Ezetimibe]  2013    Other (comments)  
 gassiness Zocor [Simvastatin]    Myalgia Current Immunizations  Reviewed on 10/19/2012 Name Date Influenza Vaccine (Quad) PF 10/2/2015 10:37 AM  
 Influenza Vaccine PF 2015 Influenza Vaccine Split 10/19/2012, 10/21/2011  2:06 PM  
 Influenza Vaccine Whole 10/15/2010 TD Vaccine 4/15/2011 TDAP Vaccine 2012 Not reviewed this visit You Were Diagnosed With   
  
 Codes Comments Hypovitaminosis D    -  Primary ICD-10-CM: E55.9 ICD-9-CM: 268.9 Vitals BP Pulse Temp Resp Height(growth percentile) Weight(growth percentile) 106/72 83 98.7 °F (37.1 °C) (Oral) 14 5' (1.524 m) 162 lb (73.5 kg) SpO2 BMI OB Status Smoking Status 94% 31.64 kg/m2 Hysterectomy Never Smoker Vitals History BMI and BSA Data Body Mass Index Body Surface Area  
 31.64 kg/m 2 1.76 m 2 Preferred Pharmacy Pharmacy Name Phone Ajit Pérez [facility] Your Updated Medication List  
  
   
This list is accurate as of: 1/29/18  9:30 AM.  Always use your most recent med list.  
  
  
  
  
 albuterol 90 mcg/actuation inhaler Commonly known as:  PROVENTIL HFA, VENTOLIN HFA, PROAIR HFA Take 2 Puffs by inhalation every six (6) hours as needed for Wheezing. ALPRAZolam 1 mg tablet Commonly known as:  Aloma Gaudy Take 1 Tab by mouth three (3) times daily as needed for Anxiety. Max Daily Amount: 3 mg. amLODIPine 10 mg tablet Commonly known as:  Sanjuana Callstephanie Take 1 Tab by mouth daily. citalopram 20 mg tablet Commonly known as:  Sg Lechuga Take 1 Tab by mouth daily. clotrimazole-betamethasone topical cream  
Commonly known as:  Ronan Maldonado Apply thin layer to affected area TWICE DAILY AS NEEDED  
  
 ergocalciferol 50,000 unit capsule Commonly known as:  VITAMIN D2 Take 1 Cap by mouth every seven (7) days. fluticasone 50 mcg/actuation nasal spray Commonly known as:  FLONASE  
2 sprays each nostril daily  
  
 gabapentin 600 mg tablet Commonly known as:  NEURONTIN Take 1 Tab by mouth three (3) times daily. levothyroxine 50 mcg tablet Commonly known as:  SYNTHROID Take 1 Tab by mouth Daily (before breakfast). PREMARIN 0.3 mg tablet Generic drug:  conjugated estrogens Take 0.3 mg by mouth daily. PriLOSEC 20 mg capsule Generic drug:  omeprazole Take 20 mg by mouth daily. * VITAMIN B-12 500 mcg tablet Generic drug:  cyanocobalamin Take 500 mcg by mouth daily. * cyanocobalamin 1,000 mcg tablet Take 1,000 mcg by mouth daily. vitamin E 400 unit capsule Commonly known as:  [address] Take 800 Units by mouth daily. * Notice: This list has 2 medication(s) that are the same as other medications prescribed for you. Read the directions carefully, and ask your doctor or other care provider to review them with you. To-Do List   
 01/29/2018 Lab:  VITAMIN D, 25 HYDROXY Patient Instructions Advance Directives: Care Instructions Your Care Instructions An advance directive is a legal way to state your wishes at the end of your life. It tells your family and your doctor what to do if you can no longer say what you want. There are two main types of advance directives. You can change them any time that your wishes change. · A living will tells your family and your doctor your wishes about life support and other treatment. · A durable power of  for health care lets you name a person to make treatment decisions for you when you can't speak for yourself. This person is called a health care agent. If you do not have an advance directive, decisions about your medical care may be made by a doctor or a  who doesn't know you. It may help to think of an advance directive as a gift to the people who care for you. If you have one, they won't have to make tough decisions by themselves. Follow-up care is a key part of your treatment and safety. Be sure to make and go to all appointments, and call your doctor if you are having problems. It's also a good idea to know your test results and keep a list of the medicines you take. How can you care for yourself at home? · Discuss your wishes with your loved ones and your doctor. This way, there are no surprises. · Many states have a unique form. Or you might use a universal form that has been approved by many states.  This kind of form can sometimes be completed and stored online. Your electronic copy will then be available wherever you have a connection to the Internet. In most cases, doctors will respect your wishes even if you have a form from a different state. · You don't need a  to do an advance directive. But you may want to get legal advice. · Think about these questions when you prepare an advance directive: ¨ Who do you want to make decisions about your medical care if you are not able to? Many people choose a family member or close friend. ¨ Do you know enough about life support methods that might be used? If not, talk to your doctor so you understand. ¨ What are you most afraid of that might happen? You might be afraid of having pain, losing your independence, or being kept alive by machines. ¨ Where would you prefer to die? Choices include your home, a hospital, or a nursing home. ¨ Would you like to have information about hospice care to support you and your family? ¨ Do you want to donate organs when you die? ¨ Do you want certain Jehovah's witness practices performed before you die? If so, put your wishes in the advance directive. · Read your advance directive every year, and make changes as needed. When should you call for help? Be sure to contact your doctor if you have any questions. Where can you learn more? Go to http://ozzie-delphine.info/. Enter R264 in the search box to learn more about \"Advance Directives: Care Instructions. \" Current as of: September 24, 2016 Content Version: 11.4 © 2117-1611 [employer]. Care instructions adapted under license by [employer] (which disclaims liability or warranty for this information). If you have questions about a medical condition or this instruction, always ask your healthcare professional. [place] [address] any warranty or liability for your use of this information. Please provide this summary of care documentation to your next provider. Your primary care clinician is listed as Yara Horne. If you have any questions after today's visit, please call [phone].

## 2018-01-30 LAB — 25(OH)D3+25(OH)D2 SERPL-MCNC: 37.5 NG/ML (ref 30–100)

## 2018-03-05 RX ORDER — AMLODIPINE BESYLATE 10 MG/1
TABLET ORAL
Qty: 30 TAB | Refills: 0 | Status: SHIPPED | OUTPATIENT
Start: 2018-03-05 | End: 2018-04-03 | Stop reason: SDUPTHER

## 2018-03-21 DIAGNOSIS — E55.9 HYPOVITAMINOSIS D: ICD-10-CM

## 2018-03-21 RX ORDER — ERGOCALCIFEROL 1.25 MG/1
50000 CAPSULE ORAL
Qty: 12 CAP | Refills: 3 | Status: SHIPPED | OUTPATIENT
Start: 2018-03-21 | End: 2018-03-22 | Stop reason: SDUPTHER

## 2018-03-21 RX ORDER — CITALOPRAM 20 MG/1
20 TABLET, FILM COATED ORAL DAILY
Qty: 90 TAB | Refills: 3 | Status: SHIPPED | OUTPATIENT
Start: 2018-03-21 | End: 2018-03-22 | Stop reason: SDUPTHER

## 2018-03-22 RX ORDER — CITALOPRAM 20 MG/1
20 TABLET, FILM COATED ORAL DAILY
Qty: 90 TAB | Refills: 3 | Status: SHIPPED | OUTPATIENT
Start: 2018-03-22 | End: 2019-04-15 | Stop reason: SDUPTHER

## 2018-03-22 RX ORDER — ERGOCALCIFEROL 1.25 MG/1
50000 CAPSULE ORAL
Qty: 12 CAP | Refills: 3 | Status: SHIPPED | OUTPATIENT
Start: 2018-03-22 | End: 2019-03-05 | Stop reason: SDUPTHER

## 2018-03-22 NOTE — TELEPHONE ENCOUNTER
These were to go to [facility]  #  [phone]- call PT when this has been corrected

## 2018-03-23 RX ORDER — GABAPENTIN 600 MG/1
600 TABLET ORAL 3 TIMES DAILY
Qty: 90 TAB | Refills: 11 | Status: SHIPPED | OUTPATIENT
Start: 2018-03-23 | End: 2019-04-22 | Stop reason: SDUPTHER

## 2018-04-03 RX ORDER — AMLODIPINE BESYLATE 10 MG/1
TABLET ORAL
Qty: 30 TAB | Refills: 0 | Status: SHIPPED | OUTPATIENT
Start: 2018-04-03 | End: 2018-04-05 | Stop reason: SDUPTHER

## 2018-04-03 RX ORDER — LEVOTHYROXINE SODIUM 50 UG/1
TABLET ORAL
Qty: 30 TAB | Refills: 0 | Status: SHIPPED | OUTPATIENT
Start: 2018-04-03 | End: 2018-04-05 | Stop reason: SDUPTHER

## 2018-04-05 RX ORDER — LEVOTHYROXINE SODIUM 50 UG/1
50 TABLET ORAL
Qty: 90 TAB | Refills: 0 | Status: SHIPPED | OUTPATIENT
Start: 2018-04-05 | End: 2018-05-29 | Stop reason: SDUPTHER

## 2018-04-05 RX ORDER — AMLODIPINE BESYLATE 10 MG/1
10 TABLET ORAL DAILY
Qty: 90 TAB | Refills: 0 | Status: SHIPPED | OUTPATIENT
Start: 2018-04-05 | End: 2018-06-25 | Stop reason: SDUPTHER

## 2018-04-05 NOTE — TELEPHONE ENCOUNTER
Insurance requires a minimum fill for 90 days. If appropriate, please sign pended medication order. If not, please notify me. Requested Prescriptions     Pending Prescriptions Disp Refills    amLODIPine (NORVASC) 10 mg tablet 90 Tab 0     Sig: Take 1 Tab by mouth daily.  levothyroxine (SYNTHROID) 50 mcg tablet 90 Tab 0     Sig: Take 1 Tab by mouth Daily (before breakfast). Signed Prescriptions Disp Refills    amLODIPine (NORVASC) 10 mg tablet 30 Tab 0     Sig: TAKE 1 TABLET BY MOUTH EVERY DAY.      Authorizing Provider: Prudencio Adam

## 2018-05-11 DIAGNOSIS — R21 RASH: ICD-10-CM

## 2018-05-11 RX ORDER — CLOTRIMAZOLE AND BETAMETHASONE DIPROPIONATE 10; .64 MG/G; MG/G
CREAM TOPICAL
Qty: 30 G | Refills: 1 | Status: SHIPPED | OUTPATIENT
Start: 2018-05-11

## 2018-05-11 NOTE — TELEPHONE ENCOUNTER
Last Visit: 01/29/2018 with MD Emir Willson    Next Appointment: 01/31/2019 with MD Emir Willson   Previous Refill Encounters: 09/11/2015 per MD Taylor 30g with 1 refill    Requested Prescriptions     Pending Prescriptions Disp Refills    clotrimazole-betamethasone (LOTRISONE) topical cream 30 g 1     Sig: Apply thin layer to affected area TWICE DAILY AS NEEDED

## 2018-05-29 NOTE — TELEPHONE ENCOUNTER
Last Visit: 1/29/18-JUSTYN Taylor  Next Appt: 1/30/19-JUSTYN Taylor  Previous Refill Encounter: 4/5/18-Qty-90 +0 refills    Requested Prescriptions     Pending Prescriptions Disp Refills    levothyroxine (SYNTHROID) 50 mcg tablet 90 Tab 0     Sig: Take 1 Tab by mouth Daily (before breakfast).

## 2018-05-30 RX ORDER — LEVOTHYROXINE SODIUM 50 UG/1
50 TABLET ORAL
Qty: 90 TAB | Refills: 0 | Status: SHIPPED | OUTPATIENT
Start: 2018-05-30 | End: 2018-10-21 | Stop reason: SDUPTHER

## 2018-06-25 ENCOUNTER — TELEPHONE (OUTPATIENT)
Dept: INTERNAL MEDICINE CLINIC | Age: 53
End: 2018-06-25

## 2018-06-25 RX ORDER — AMLODIPINE BESYLATE 10 MG/1
10 TABLET ORAL DAILY
Qty: 90 TAB | Refills: 0 | Status: SHIPPED | OUTPATIENT
Start: 2018-06-25 | End: 2018-09-30 | Stop reason: SDUPTHER

## 2018-07-29 DIAGNOSIS — E03.4 HYPOTHYROIDISM DUE TO ACQUIRED ATROPHY OF THYROID: ICD-10-CM

## 2018-07-31 DIAGNOSIS — E03.4 HYPOTHYROIDISM DUE TO ACQUIRED ATROPHY OF THYROID: ICD-10-CM

## 2018-10-12 ENCOUNTER — CLINICAL SUPPORT (OUTPATIENT)
Dept: INTERNAL MEDICINE CLINIC | Age: 53
End: 2018-10-12

## 2018-10-12 DIAGNOSIS — Z23 ENCOUNTER FOR IMMUNIZATION: Primary | ICD-10-CM

## 2018-10-12 NOTE — PROGRESS NOTES
Chief Complaint   Patient presents with    Immunization/Injection     Influenza Vaccine     Patient given influenza vaccine, FLUARIX Quadrivalent, in left deltoid. Instructed patient to sit and wait 10-20 minutes before leaving the premises so that we can watch for any complications or adverse reactions. Patient given vaccine information statement handout before vaccine was given. Patient tolerated well without adverse reactions or complications.

## 2018-11-10 ENCOUNTER — HOSPITAL ENCOUNTER (OUTPATIENT)
Dept: MAMMOGRAPHY | Age: 53
Discharge: HOME | End: 2018-11-10
Attending: OBSTETRICS & GYNECOLOGY
Payer: COMMERCIAL

## 2018-11-10 DIAGNOSIS — Z12.31 VISIT FOR SCREENING MAMMOGRAM: ICD-10-CM

## 2018-11-10 PROCEDURE — 77067 SCR MAMMO BI INCL CAD: CPT

## 2018-11-27 ENCOUNTER — TELEPHONE (OUTPATIENT)
Dept: INTERNAL MEDICINE CLINIC | Age: 53
End: 2018-11-27

## 2018-11-27 NOTE — TELEPHONE ENCOUNTER
Backdated [employer] referral approved. Certification number: [id number]. Called and notified the patient.

## 2018-11-27 NOTE — TELEPHONE ENCOUNTER
[facility]- needs backdated referral  To Dr Aric Mcdaniel  [address] [facility]-11/26/18 for congestion ,fever, sore throat

## 2019-01-24 ENCOUNTER — APPOINTMENT (OUTPATIENT)
Dept: INTERNAL MEDICINE CLINIC | Age: 54
End: 2019-01-24

## 2019-01-24 ENCOUNTER — HOSPITAL ENCOUNTER (OUTPATIENT)
Dept: LAB | Age: 54
Discharge: HOME | End: 2019-01-24

## 2019-01-24 PROCEDURE — 99001 SPECIMEN HANDLING PT-LAB: CPT

## 2019-01-25 LAB
ALBUMIN SERPL-MCNC: 4.7 G/DL (ref 3.5–5.5)
ALBUMIN/GLOB SERPL: 2 {RATIO} (ref 1.2–2.2)
ALP SERPL-CCNC: 61 IU/L (ref 39–117)
ALT SERPL-CCNC: 24 IU/L (ref 0–32)
AST SERPL-CCNC: 22 IU/L (ref 0–40)
BILIRUB SERPL-MCNC: 0.5 MG/DL (ref 0–1.2)
BUN SERPL-MCNC: 9 MG/DL (ref 6–24)
BUN/CREAT SERPL: 13 (ref 9–23)
CALCIUM SERPL-MCNC: 9.7 MG/DL (ref 8.7–10.2)
CHLORIDE SERPL-SCNC: 100 MMOL/L (ref 96–106)
CHOLEST SERPL-MCNC: 265 MG/DL (ref 100–199)
CO2 SERPL-SCNC: 26 MMOL/L (ref 20–29)
CREAT SERPL-MCNC: 0.67 MG/DL (ref 0.57–1)
ERYTHROCYTE [DISTWIDTH] IN BLOOD BY AUTOMATED COUNT: 13.2 % (ref 12.3–15.4)
GLOBULIN SER CALC-MCNC: 2.4 G/DL (ref 1.5–4.5)
GLUCOSE SERPL-MCNC: 96 MG/DL (ref 65–99)
HCT VFR BLD AUTO: 43.6 % (ref 34–46.6)
HDLC SERPL-MCNC: 47 MG/DL
HGB BLD-MCNC: 14.3 G/DL (ref 11.1–15.9)
INTERPRETATION, 910389: NORMAL
LDLC SERPL CALC-MCNC: 172 MG/DL (ref 0–99)
MCH RBC QN AUTO: 30.9 PG (ref 26.6–33)
MCHC RBC AUTO-ENTMCNC: 32.8 G/DL (ref 31.5–35.7)
MCV RBC AUTO: 94 FL (ref 79–97)
PLATELET # BLD AUTO: 346 X10E3/UL (ref 150–379)
POTASSIUM SERPL-SCNC: 4.7 MMOL/L (ref 3.5–5.2)
PROT SERPL-MCNC: 7.1 G/DL (ref 6–8.5)
RBC # BLD AUTO: 4.63 X10E6/UL (ref 3.77–5.28)
SODIUM SERPL-SCNC: 142 MMOL/L (ref 134–144)
T4 FREE SERPL-MCNC: 1.13 NG/DL (ref 0.82–1.77)
TRIGL SERPL-MCNC: 230 MG/DL (ref 0–149)
TSH SERPL DL<=0.005 MIU/L-ACNC: 2.62 UIU/ML (ref 0.45–4.5)
VLDLC SERPL CALC-MCNC: 46 MG/DL (ref 5–40)
WBC # BLD AUTO: 5.9 X10E3/UL (ref 3.4–10.8)

## 2019-01-27 NOTE — PROGRESS NOTES
48 y.o. white female who presents for RPE Non cardiovascular complaints. Not much time for exercise although she recently joined [employer] and has been going 5 times a week in the last month. Doing the treadmill at an 18-minute mile pace usually doing 30+ minutes. She is also doing toning exercises for the abdomen. She is trying to watch her diet. Her  actually introduced her to intermittent fasting after we talked about it with him and she did it for over a month, think she may have lost a little bit of weight but came off of it. Just under a lot of stress taking care of her parents and having conflict with her daughter. She stress eats, requesting Xanax refill. Vitals 2019 Weight 163 lb 162 lb 161 lb 161 lb 162 lb No GI or  issues. The Josphine [place] is not being used very often but it does help. IF  Past Medical History:  
Diagnosis Date  Agatston CAC score, <100 2017  
 ca score zero  Allergic rhinitis  Anxiety  Colon adenoma 3/16 Dr Hallie Ward  Depression  Dyslipidemia   
 intol 5 statins/tricor  Fatty liver   
 negative serologies ; Fib-4 was 0.3 from 7/15  FHx: heart disease  GERD (gastroesophageal reflux disease)  Hypertension  Hypothyroidism  Hypovitaminosis D  Migraines CT head negative   Nephrolithiasis   Neuropathy   
 on EMG Dr. Gio hTomas  Obesity   
 peak weight 163 lbs, bmi 31.3 from 2/15; dec johnathan suppressants, med supervised wt loss; IF  start weight 163 lbs  Pulmonary nodule 2006  
 no change   Seasonal allergies  Tension headache Past Surgical History:  
Procedure Laterality Date  COLONOSCOPY N/A 2017 COLONOSCOPY performed by Berta Tucker MD at [facility] ENDOSCOPY  
 HX  SECTION    
 x4 due to cephalopelvic disproportion  HX ORTHOPAEDIC    
 left foot  HX TONSILLECTOMY  HX TOTAL VAGINAL HYSTERECTOMY  HX TUBAL LIGATION    
 bilateral  
 [place] COLSC FLX W/RMVL OF TUMOR Sheela Jones 3/16 adenoma; Dr Alphonso Villagran 8/17  VASCULAR SURGERY PROCEDURE UNLIST  1/06 NADEGE 1.29/1.17 Social History Socioeconomic History  Marital status:  Spouse name: Not on file  Number of children: 4  
 Years of education: Not on file  Highest education level: Not on file Social Needs  Financial resource strain: Not on file  Food insecurity - worry: Not on file  Food insecurity - inability: Not on file  Transportation needs - medical: Not on file  Transportation needs - non-medical: Not on file Occupational History  Occupation: housewife Tobacco Use  Smoking status: Never Smoker  Smokeless tobacco: Never Used Substance and Sexual Activity  Alcohol use: No  
 Drug use: No  
 Sexual activity: Not on file Other Topics Concern  Not on file Social History Narrative  Not on file Current Outpatient Medications Medication Sig  
 ascorbic acid, vitamin C, (VITAMIN C) 500 mg tablet Take 1,000 mg by mouth.  flavoxATE (URISPAS) 100 mg tablet Take 100 mg by mouth three (3) times daily as needed.  ALPRAZolam (XANAX) 1 mg tablet Take 1 Tab by mouth three (3) times daily as needed for Anxiety. Max Daily Amount: 3 mg.  levothyroxine (SYNTHROID) 50 mcg tablet TAKE 1 TABLET BY MOUTH EVERY DAY BEFORE BREAKFAST  amLODIPine (NORVASC) 10 mg tablet TAKE 1 TABLET BY MOUTH EVERY DAY  clotrimazole-betamethasone (LOTRISONE) topical cream Apply thin layer to affected area TWICE DAILY AS NEEDED  
 gabapentin (NEURONTIN) 600 mg tablet Take 1 Tab by mouth three (3) times daily.  citalopram (CELEXA) 20 mg tablet Take 1 Tab by mouth daily.  ergocalciferol (VITAMIN D2) 50,000 unit capsule Take 1 Cap by mouth every seven (7) days.  conjugated estrogens (PREMARIN) 0.3 mg tablet Take 0.3 mg by mouth daily.  vitamin E (AQUA GEMS) 400 unit capsule Take 800 Units by mouth daily.  cyanocobalamin 1,000 mcg tablet Take 1,000 mcg by mouth daily.  fluticasone (FLONASE) 50 mcg/actuation nasal spray 2 sprays each nostril daily (Patient taking differently: as needed. 2 sprays each nostril daily)  omeprazole (PRILOSEC) 20 mg capsule Take 20 mg by mouth daily. No current facility-administered medications for this visit. Allergies Allergen Reactions  Crestor [Rosuvastatin] Myalgia  Lipitor [Atorvastatin] Myalgia  Mevacor [Lovastatin] Myalgia  Mobic [Meloxicam] Nausea Only and Other (comments)  
  headache  Niaspan [Niacin] Other (comments)  
  flushing  Percocet [Oxycodone-Acetaminophen] Other (comments)  Pravachol [Pravastatin] Myalgia  Tricor [Fenofibrate Micronized] Other (comments) Elevated LFTs  Zetia [Ezetimibe] Other (comments)  
  gassiness  Zocor [Simvastatin] Myalgia REVIEW OF SYSTEMS: mammo 10/16, Dr Lenny Solorio 9/15, colo 3/16 Dr An Frey Ophtho  no vision change or eye pain Oral  no mouth pain, tongue or tooth problems Ears  no hearing loss, ear pain, fullness, no swallowing problems Cardiac  no CP, PND, orthopnea, edema, palpitations or syncope Chest  no breast masses Resp  no wheezing, chronic coughing, dyspnea GI  no heartburn, nausea, vomiting, change in bowel habits, bleeding, hemorrhoids Urinary  no dysuria, hematuria, flank pain, urgency, frequency Neuro  no focal weakness, numbness, paresthesias, incoordination, ataxia, involuntary movements Endo - no polyuria, polydipsia, nocturia, hot flashes Visit Vitals /78 Pulse 84 Temp 98 °F (36.7 °C) (Oral) Resp 14 Ht 5' (1.524 m) Wt 163 lb (73.9 kg) SpO2 96% BMI 31.83 kg/m² A&O x3 Affect is appropriate. Mood stable No apparent distress Anicteric, no JVD, adenopathy or thyromegaly. No carotid bruits or radiated murmur Lungs showed crackles at the left base, dec bs but no clear wheezing Heart showed regular rate and rhythm. No murmur, rubs, gallops Abdomen soft nontender, no hepatosplenomegaly or masses. Extremities without edema. Pulses 1-2+ symmetrically LABS From 10/11 showed ldl-p 2955  chol 199, tg 390, hdl 39, ldl-c 82 From  2/12  showed ldl-p 2029, chol 223, tg 244, hdl 38, ldl-c 136,  gluc 90, cr 0.70,      alt 33 From  6/12  showed ldl-p 2427, chol 221, tg 196, hdl 45, ldl-c 137 From 10/12 showed ldl-p 2056, chol 193, tg 261, hdl 37, ldl-c 104,       tsh 1.56 From 2/13 showed       chol 164, tg 190, hdl 44, ldl-c 113 From 9/13 showed       chol 201, tg 195, hdl 38, ldl-c 124, gluc 89, cr 0.70, gfr 104, alt 18, tsh 1.48,     vit d 35.1 From 3/14 showed       chol 213, tg 269, hdl 40, ldl-c 119, gluc 96, cr 0.63, gfr>60,  alt 17 From 7/14 showed       chol 216, tg 237, hdl 38, ldl-c 131, gluc 87, cr 0.60, gfr>60,  alt 10, tsh 1.29 From 1/15 showed       chol 210, tg 305, hdl 40, ldl-c 109, gluc 89, cr 0.62, gfr>60,  alt 11 From 7/15 showed       chol 209, tg 231, hdl 45, ldl-c 118, gluc 89, cr 0.70, gfr>60,  alt 15, tsh 3.23, wbc 6.7, hb 14.6, plt 294 From 1/16 showed       chol 212, tg 314, hdl 40, ldl-c 109,                     hep c neg From 7/16 showed       chol 218, tg 253, hdl 37, ldl-c 130, gluc 88, cr 0.61, gfr 106, alt 27, tsh 2.71 From 1/17 showed       chol 218, tg 229, hdl 46, ldl-c 126,            wbc 6.8, hb 16.5, plt 321, vit d 21.1 From 7/17 showed       chol 242, tg 184, hdl 45, ldl-c 160,        tsh 2.04,               vit d 40.2 From 1/18 showed       chol 244, tg 253, hdl 44, ldl-c 149, gluc 90, cr 0.64, gfr 103, alt 28,               vit d 37.5 Results for orders placed or performed in visit on 07/29/18 CBC W/O DIFF Result Value Ref Range WBC 5.9 3.4 - 10.8 x10E3/uL  
 RBC 4.63 3.77 - 5.28 x10E6/uL HGB 14.3 11.1 - 15.9 g/dL HCT 43.6 34.0 - 46.6 % MCV 94 79 - 97 fL  
 MCH 30.9 26.6 - 33.0 pg  
 MCHC 32.8 31.5 - 35.7 g/dL  
 RDW 13.2 12.3 - 15.4 % PLATELET 745 797 - 659 x10E3/uL LIPID PANEL Result Value Ref Range Cholesterol, total 265 (H) 100 - 199 mg/dL Triglyceride 230 (H) 0 - 149 mg/dL HDL Cholesterol 47 >39 mg/dL VLDL, calculated 46 (H) 5 - 40 mg/dL LDL, calculated 172 (H) 0 - 99 mg/dL METABOLIC PANEL, COMPREHENSIVE Result Value Ref Range Glucose 96 65 - 99 mg/dL BUN 9 6 - 24 mg/dL Creatinine 0.67 0.57 - 1.00 mg/dL GFR est non- >59 mL/min/1.73 GFR est  >59 mL/min/1.73  
 BUN/Creatinine ratio 13 9 - 23 Sodium 142 134 - 144 mmol/L Potassium 4.7 3.5 - 5.2 mmol/L Chloride 100 96 - 106 mmol/L  
 CO2 26 20 - 29 mmol/L Calcium 9.7 8.7 - 10.2 mg/dL Protein, total 7.1 6.0 - 8.5 g/dL Albumin 4.7 3.5 - 5.5 g/dL GLOBULIN, TOTAL 2.4 1.5 - 4.5 g/dL A-G Ratio 2.0 1.2 - 2.2 Bilirubin, total 0.5 0.0 - 1.2 mg/dL Alk. phosphatase 61 39 - 117 IU/L  
 AST (SGOT) 22 0 - 40 IU/L  
 ALT (SGPT) 24 0 - 32 IU/L  
TSH 3RD GENERATION Result Value Ref Range TSH 2.620 0.450 - 4.500 uIU/mL T4, FREE Result Value Ref Range T4, Free 1.13 0.82 - 1.77 ng/dL CVD REPORT Result Value Ref Range INTERPRETATION Note Patient Active Problem List  
Diagnosis Code  Nephrolithiasis Dr. Catina Bowen N20.0  Hypovitaminosis D E55.9  Dyslipidemia E78.5  Hypothyroidism due to acquired atrophy of thyroid E03.4  Obesity (BMI 30-39. 9) E66.9  
 Colon adenoma 3/16 Dr Diana Morton D12.6  Mild episode of recurrent major depressive disorder ([facility] [address] F33.0  Anxiety F41.9 Assessment and plan: 1. HLP but low Ca score. Will stay on dietary and lifestyle measures for now. Intol 5 statins. 2. Hypovit d. Continue current regimen. 3. HTN. Continue current 4. Hypothyroidism. Therapeutic 5. Overweight/obese. Dietary and lifestyle measures, calorie counting. Previously not interested in johnathan suppressants. Intermittent fasting discussed at length. Explained the concepts in detail. Went over possible physiologic changes that could occur and how that would possibly impact her situation in a positive way. Discussed 16:8 program in particular. She will do some more research and consider implementing in the near future, standard handout given 6. Anxiety. Prn xanax sent 7. Colon adenoma. Fiber, colo 2021 8. Bladder spasm. Prn Sara Bob 9. She requested trans-scopolamine patch for a cruise that they are going on in the next couple of months RTC 1/20 Above conditions discussed at length and patient vocalized understanding. All questions answered to patient satisfaction Lipid lowering therapy not prescibed for medical reasons.

## 2019-01-31 ENCOUNTER — OFFICE VISIT (OUTPATIENT)
Dept: INTERNAL MEDICINE CLINIC | Age: 54
End: 2019-01-31

## 2019-01-31 VITALS
TEMPERATURE: 98 F | BODY MASS INDEX: 32 KG/M2 | RESPIRATION RATE: 14 BRPM | HEIGHT: 60 IN | SYSTOLIC BLOOD PRESSURE: 112 MMHG | OXYGEN SATURATION: 96 % | DIASTOLIC BLOOD PRESSURE: 78 MMHG | HEART RATE: 84 BPM | WEIGHT: 163 LBS

## 2019-01-31 DIAGNOSIS — E55.9 HYPOVITAMINOSIS D: ICD-10-CM

## 2019-01-31 DIAGNOSIS — T75.3XXD SEA SICKNESS, SUBSEQUENT ENCOUNTER: ICD-10-CM

## 2019-01-31 DIAGNOSIS — E66.9 OBESITY (BMI 30-39.9): ICD-10-CM

## 2019-01-31 DIAGNOSIS — F41.9 ANXIETY: ICD-10-CM

## 2019-01-31 DIAGNOSIS — N20.0 NEPHROLITHIASIS: ICD-10-CM

## 2019-01-31 DIAGNOSIS — D12.6 COLON ADENOMA: ICD-10-CM

## 2019-01-31 DIAGNOSIS — E78.5 DYSLIPIDEMIA: ICD-10-CM

## 2019-01-31 DIAGNOSIS — F33.0 MILD EPISODE OF RECURRENT MAJOR DEPRESSIVE DISORDER (HCC): ICD-10-CM

## 2019-01-31 DIAGNOSIS — Z00.00 PHYSICAL EXAM: Primary | ICD-10-CM

## 2019-01-31 DIAGNOSIS — E03.4 HYPOTHYROIDISM DUE TO ACQUIRED ATROPHY OF THYROID: ICD-10-CM

## 2019-01-31 RX ORDER — FLAVOXATE HYDROCHLORIDE 100 MG/1
100 TABLET ORAL
Qty: 90 TAB | Status: CANCELLED | OUTPATIENT
Start: 2019-01-31

## 2019-01-31 RX ORDER — ASCORBIC ACID 500 MG
1000 TABLET ORAL
COMMUNITY

## 2019-01-31 RX ORDER — FLAVOXATE HYDROCHLORIDE 100 MG/1
100 TABLET ORAL
COMMUNITY

## 2019-01-31 RX ORDER — ALPRAZOLAM 1 MG/1
1 TABLET ORAL
Qty: 40 TAB | Refills: 1 | OUTPATIENT
Start: 2019-01-31 | End: 2019-06-06 | Stop reason: SDUPTHER

## 2019-01-31 RX ORDER — SCOLOPAMINE TRANSDERMAL SYSTEM 1 MG/1
1 PATCH, EXTENDED RELEASE TRANSDERMAL
Qty: 3 PATCH | Refills: 0 | Status: SHIPPED | OUTPATIENT
Start: 2019-01-31

## 2019-01-31 NOTE — PROGRESS NOTES
1. Have you been to the ER, urgent care clinic or hospitalized since your last visit? NO.  
 
2. Have you seen or consulted any other health care providers outside of the [address] [place] [address] since your last visit (Include any pap smears or colon screening)? NO Chief Complaint Patient presents with  Cholesterol Problem 1 year follow up with labs

## 2019-02-04 ENCOUNTER — TELEPHONE (OUTPATIENT)
Dept: INTERNAL MEDICINE CLINIC | Age: 54
End: 2019-02-04

## 2019-02-04 DIAGNOSIS — T75.3XXA SEA SICKNESS, INITIAL ENCOUNTER: Primary | ICD-10-CM

## 2019-02-04 NOTE — TELEPHONE ENCOUNTER
PT says her Xanax went to [facility] cancelled it from therer- it  needs to go to [facility] on [address]

## 2019-02-04 NOTE — TELEPHONE ENCOUNTER
The generic was dc'ed from [facility]  The original novartis version I believe is still in production  pls call pharm for clarification

## 2019-02-05 RX ORDER — MECLIZINE HYDROCHLORIDE 25 MG/1
25 TABLET ORAL
Qty: 30 TAB | Refills: 0 | Status: SHIPPED | OUTPATIENT
Start: 2019-02-05 | End: 2019-02-15

## 2019-03-05 DIAGNOSIS — E55.9 HYPOVITAMINOSIS D: ICD-10-CM

## 2019-03-05 RX ORDER — ERGOCALCIFEROL 1.25 MG/1
50000 CAPSULE ORAL
Qty: 12 CAP | Refills: 3 | Status: SHIPPED | OUTPATIENT
Start: 2019-03-05 | End: 2020-02-03

## 2019-03-05 NOTE — TELEPHONE ENCOUNTER
Last Visit: 01/31/2019 with MD Beckie Brantley  Next Appointment: 02/04/2020 with MD Beckie Brantley  Previous Refill Encounter(s): 03/22/2018 per MD Beckie Brantley #12 with 3 refills    Requested Prescriptions     Pending Prescriptions Disp Refills    ergocalciferol (VITAMIN D2) 50,000 unit capsule 12 Cap 3     Sig: Take 1 Cap by mouth every seven (7) days.

## 2019-04-15 RX ORDER — CITALOPRAM 20 MG/1
20 TABLET, FILM COATED ORAL DAILY
Qty: 90 TAB | Refills: 3 | Status: SHIPPED | OUTPATIENT
Start: 2019-04-15 | End: 2020-03-27

## 2019-04-15 NOTE — TELEPHONE ENCOUNTER
Last Visit: 1/31/19 with MD Kim Escamilla  Next Appointment: 2/4/20 with MD Kim Escamilla  Previous Refill Encounter(s): 3/22/18 #90 with 3 refills    Requested Prescriptions     Pending Prescriptions Disp Refills    citalopram (CELEXA) 20 mg tablet 90 Tab 3     Sig: Take 1 Tab by mouth daily.

## 2019-04-22 RX ORDER — GABAPENTIN 600 MG/1
600 TABLET ORAL 3 TIMES DAILY
Qty: 270 TAB | Refills: 3 | Status: SHIPPED | OUTPATIENT
Start: 2019-04-22 | End: 2019-08-02 | Stop reason: SDUPTHER

## 2019-04-22 NOTE — TELEPHONE ENCOUNTER
Last Visit: 1/31/19 with MD Delonte Lozano  Next Appointment: 2/4/20 with MD Delonte Lozano  Previous Refill Encounter(s): 3/23/18 #90 with 11 refills    Requested Prescriptions     Pending Prescriptions Disp Refills    gabapentin (NEURONTIN) 600 mg tablet 270 Tab 3     Sig: Take 1 Tab by mouth three (3) times daily.

## 2019-06-06 DIAGNOSIS — F41.9 ANXIETY: ICD-10-CM

## 2019-06-06 NOTE — TELEPHONE ENCOUNTER
[facility] as 2/4/19 for a 13 d/s. Last Visit: 1/31/19 with MD Jonny Tan  Next Appointment: 2/4/20 with MD Jonny Tan  Previous Refill Encounter(s): 1/31/19 #40 with 1 refill    Requested Prescriptions     Pending Prescriptions Disp Refills    ALPRAZolam (XANAX) 1 mg tablet 40 Tab 1     Sig: Take 1 Tab by mouth three (3) times daily as needed for Anxiety. Max Daily Amount: 3 mg.

## 2019-06-07 RX ORDER — ALPRAZOLAM 1 MG/1
1 TABLET ORAL
Qty: 40 TAB | Refills: 1 | OUTPATIENT
Start: 2019-06-07 | End: 2020-05-01 | Stop reason: SDUPTHER

## 2019-07-18 RX ORDER — CITALOPRAM 20 MG/1
20 TABLET, FILM COATED ORAL DAILY
Qty: 90 TAB | Refills: 3 | Status: CANCELLED | OUTPATIENT
Start: 2019-07-18

## 2019-07-18 NOTE — TELEPHONE ENCOUNTER
Patient still has refills at the pharmacy. I called [facility] to confirm and requested they refill it for the patient. No further action needed.

## 2019-07-31 DIAGNOSIS — E03.4 HYPOTHYROIDISM DUE TO ACQUIRED ATROPHY OF THYROID: ICD-10-CM

## 2019-07-31 DIAGNOSIS — Z00.00 PHYSICAL EXAM: ICD-10-CM

## 2019-08-02 DIAGNOSIS — E55.9 HYPOVITAMINOSIS D: ICD-10-CM

## 2019-08-02 DIAGNOSIS — Z00.00 PHYSICAL EXAM: ICD-10-CM

## 2019-08-02 DIAGNOSIS — E03.4 HYPOTHYROIDISM DUE TO ACQUIRED ATROPHY OF THYROID: ICD-10-CM

## 2019-08-02 DIAGNOSIS — Z79.899 MEDICATION MANAGEMENT: ICD-10-CM

## 2019-08-02 DIAGNOSIS — G62.9 NEUROPATHY: Primary | ICD-10-CM

## 2019-08-02 RX ORDER — GABAPENTIN 600 MG/1
600 TABLET ORAL 3 TIMES DAILY
Qty: 270 TAB | Refills: 1 | OUTPATIENT
Start: 2019-08-02 | End: 2020-02-05 | Stop reason: SDUPTHER

## 2019-08-02 NOTE — TELEPHONE ENCOUNTER
PHONE IN RX    I am no longer authorized to pull PMPs on Dr. Adele Meyer behalf. Last Visit: 01/31/2019 with MD Belkys Freed  Next Appointment: 02/04/2020 with MD Belkys Freed    Requested Prescriptions     Pending Prescriptions Disp Refills    gabapentin (NEURONTIN) 600 mg tablet 270 Tab 1     Sig: Take 1 Tab by mouth three (3) times daily.

## 2019-08-05 ENCOUNTER — APPOINTMENT (OUTPATIENT)
Dept: INTERNAL MEDICINE CLINIC | Age: 54
End: 2019-08-05

## 2019-08-05 NOTE — TELEPHONE ENCOUNTER
Gabapentin called into [facility] pharmacy. Patient made aware she needs to do a controlled substance agreement and urine drug screen. Order placed for urine drug screen per verbal order from [address].

## 2019-08-06 LAB
AMPHETAMINES UR QL SCN: NEGATIVE NG/ML
BARBITURATES UR QL SCN: NEGATIVE NG/ML
BENZODIAZ UR QL SCN: NEGATIVE NG/ML
BZE UR QL SCN: NEGATIVE NG/ML
CANNABINOIDS UR QL SCN: NEGATIVE NG/ML
CREAT UR-MCNC: 188.3 MG/DL (ref 20–300)
FENTANYL+NORFENTANYL UR QL SCN: NEGATIVE PG/ML
MEPERIDINE UR QL: NEGATIVE NG/ML
METHADONE UR QL SCN: NEGATIVE NG/ML
OPIATES UR QL SCN: NEGATIVE NG/ML
OXYCODONE+OXYMORPHONE UR QL SCN: NEGATIVE NG/ML
PCP UR QL: NEGATIVE NG/ML
PH UR: 5.8 [PH] (ref 4.5–8.9)
PLEASE NOTE:, 733157: NORMAL
PROPOXYPH UR QL SCN: NEGATIVE NG/ML
SP GR UR: 1.02
TRAMADOL UR QL SCN: NEGATIVE NG/ML

## 2019-08-07 DIAGNOSIS — G62.9 NEUROPATHY: ICD-10-CM

## 2019-08-07 RX ORDER — GABAPENTIN 600 MG/1
600 TABLET ORAL 3 TIMES DAILY
Qty: 270 TAB | Refills: 1 | Status: CANCELLED | OUTPATIENT
Start: 2019-08-07

## 2019-08-12 ENCOUNTER — TELEPHONE (OUTPATIENT)
Dept: INTERNAL MEDICINE CLINIC | Age: 54
End: 2019-08-12

## 2019-08-12 DIAGNOSIS — M79.673 FOOT ARCH PAIN, UNSPECIFIED LATERALITY: ICD-10-CM

## 2019-08-12 DIAGNOSIS — M79.673 HEEL PAIN, UNSPECIFIED LATERALITY: Primary | ICD-10-CM

## 2019-08-12 NOTE — TELEPHONE ENCOUNTER
Pt requesting a referral to see Dr. Jacinta Dillard [phone] podiatry    She did not know the first name. She needs to be seen for heel and arch pain.      She has appt  8/29/2019

## 2019-09-23 RX ORDER — AMLODIPINE BESYLATE 10 MG/1
TABLET ORAL
Qty: 90 TAB | Refills: 3 | Status: SHIPPED | OUTPATIENT
Start: 2019-09-23 | End: 2020-09-04

## 2019-10-14 RX ORDER — LEVOTHYROXINE SODIUM 50 UG/1
TABLET ORAL
Qty: 90 TAB | Refills: 3 | Status: SHIPPED | OUTPATIENT
Start: 2019-10-14 | End: 2020-09-20

## 2019-11-14 ENCOUNTER — HOSPITAL ENCOUNTER (OUTPATIENT)
Dept: MAMMOGRAPHY | Age: 54
Discharge: HOME | End: 2019-11-14
Attending: OBSTETRICS & GYNECOLOGY
Payer: COMMERCIAL

## 2019-11-14 DIAGNOSIS — Z12.31 VISIT FOR SCREENING MAMMOGRAM: ICD-10-CM

## 2019-11-14 PROCEDURE — 77063 BREAST TOMOSYNTHESIS BI: CPT

## 2020-01-28 ENCOUNTER — APPOINTMENT (OUTPATIENT)
Dept: INTERNAL MEDICINE CLINIC | Age: 55
End: 2020-01-28

## 2020-01-29 LAB
25(OH)D3+25(OH)D2 SERPL-MCNC: 35 NG/ML (ref 30–100)
ALBUMIN SERPL-MCNC: 4.5 G/DL (ref 3.8–4.9)
ALBUMIN/GLOB SERPL: 2 {RATIO} (ref 1.2–2.2)
ALP SERPL-CCNC: 61 IU/L (ref 39–117)
ALT SERPL-CCNC: 22 IU/L (ref 0–32)
AST SERPL-CCNC: 18 IU/L (ref 0–40)
BILIRUB SERPL-MCNC: 0.3 MG/DL (ref 0–1.2)
BUN SERPL-MCNC: 10 MG/DL (ref 6–24)
BUN/CREAT SERPL: 18 (ref 9–23)
CALCIUM SERPL-MCNC: 9.1 MG/DL (ref 8.7–10.2)
CHLORIDE SERPL-SCNC: 104 MMOL/L (ref 96–106)
CHOLEST SERPL-MCNC: 253 MG/DL (ref 100–199)
CO2 SERPL-SCNC: 24 MMOL/L (ref 20–29)
CREAT SERPL-MCNC: 0.57 MG/DL (ref 0.57–1)
ERYTHROCYTE [DISTWIDTH] IN BLOOD BY AUTOMATED COUNT: 12.6 % (ref 11.7–15.4)
GLOBULIN SER CALC-MCNC: 2.2 G/DL (ref 1.5–4.5)
GLUCOSE SERPL-MCNC: 86 MG/DL (ref 65–99)
HCT VFR BLD AUTO: 41.4 % (ref 34–46.6)
HDLC SERPL-MCNC: 46 MG/DL
HGB BLD-MCNC: 14.4 G/DL (ref 11.1–15.9)
INTERPRETATION, 910389: NORMAL
LDLC SERPL CALC-MCNC: 160 MG/DL (ref 0–99)
MCH RBC QN AUTO: 32 PG (ref 26.6–33)
MCHC RBC AUTO-ENTMCNC: 34.8 G/DL (ref 31.5–35.7)
MCV RBC AUTO: 92 FL (ref 79–97)
PLATELET # BLD AUTO: 303 X10E3/UL (ref 150–450)
POTASSIUM SERPL-SCNC: 4.2 MMOL/L (ref 3.5–5.2)
PROT SERPL-MCNC: 6.7 G/DL (ref 6–8.5)
RBC # BLD AUTO: 4.5 X10E6/UL (ref 3.77–5.28)
SODIUM SERPL-SCNC: 144 MMOL/L (ref 134–144)
T4 FREE SERPL-MCNC: 1.15 NG/DL (ref 0.82–1.77)
TRIGL SERPL-MCNC: 237 MG/DL (ref 0–149)
TSH SERPL DL<=0.005 MIU/L-ACNC: 1.92 UIU/ML (ref 0.45–4.5)
VLDLC SERPL CALC-MCNC: 47 MG/DL (ref 5–40)
WBC # BLD AUTO: 5.2 X10E3/UL (ref 3.4–10.8)

## 2020-01-29 NOTE — PROGRESS NOTES
47 y.o. white female who presents for RPE    Non cardiovascular complaints. She quit [employer] but they now have a treadmill at home and she was doing that just about daily until she had a run in with the pitbull 3 weeks ago as below. bp controlled when she checks    She's done better with dietary choices and she is also doing IF for about 6 weeks now, down 5 lbs on her home scale    Vitals 2/4/2020 1/31/2019 1/29/2018 9/27/2017   Weight 160 lb 163 lb 162 lb 161 lb     No GI or  issues. She reports flu like sx about 2 days now, some people in [facility] have had similar sx. Reports cough productive of mostly clear material, nausea without vomiting or diarrhea, headache without neck stiffness or photophobia, generalized aches, f to 101 yesterday. Lastly, her son's pitbull ran into her left post knee about 3 weeks ago. She went to urgent care about 2 weeks ago, xray neg, they told her it was all soft tissue and to use motrin. She's improved and no buckling or instability to report, no swelling or dec rom or catching sensations    IF 12/19    Past Medical History:   Diagnosis Date    Agatston CAC score, <100 08/2017    ca score zero    Allergic rhinitis     Anxiety     Colon adenoma 3/16    Dr Senaida Kawasaki    Depression     Dyslipidemia     intol 5 statins/tricor    Fatty liver     negative serologies 2004; Fib-4 was 0.3 from 7/15    FHx: heart disease     GERD (gastroesophageal reflux disease)     Hypertension     Hypothyroidism     Hypovitaminosis D     Migraines     CT head negative 2004    Nephrolithiasis 1999    Neuropathy 2006    on EM[facility] Dr. Saloni Wetzel Obesity     peak weight 163 lbs, bmi 31.3 from 2/15; dec johnathan suppressants, med supervised wt loss; IF 1/19 start weight 163 lbs    Pulmonary nodule 04/2006    no change 8/17    Seasonal allergies     Tension headache      Past Surgical History:   Procedure Laterality Date    COLONOSCOPY N/A 8/30/2017    Dr. Senaida Kawasaki 3/16 adenoma;   Augusto Maze     HX  SECTION      x4 due to cephalopelvic disproportion    HX ORTHOPAEDIC      left foot    HX TONSILLECTOMY      HX TOTAL VAGINAL HYSTERECTOMY      HX TUBAL LIGATION      bilateral    VASCULAR SURGERY PROCEDURE UNLIST      NADEGE 1..     Social History     Socioeconomic History    Marital status:      Spouse name: Not on file    Number of children: 3    Years of education: Not on file    Highest education level: Not on file   Occupational History    Occupation: housewife   Social Needs    Financial resource strain: Not on file    Food insecurity:     Worry: Not on file     Inability: Not on file   [employer] needs:     Medical: Not on file     Non-medical: Not on file   Tobacco Use    Smoking status: Never Smoker    Smokeless tobacco: Never Used   Substance and Sexual Activity    Alcohol use: No    Drug use: No    Sexual activity: Not on file   Lifestyle    Physical activity:     Days per week: Not on file     Minutes per session: Not on file    Stress: Not on file   Relationships    Social connections:     Talks on phone: Not on file     Gets together: Not on file     Attends Confucianism service: Not on file     Active member of club or organization: Not on file     Attends meetings of clubs or organizations: Not on file     Relationship status: Not on file    Intimate partner violence:     Fear of current or ex partner: Not on file     Emotionally abused: Not on file     Physically abused: Not on file     Forced sexual activity: Not on file   Other Topics Concern    Not on file   Social History Narrative    Not on file     Current Outpatient Medications   Medication Sig    oseltamivir (TAMIFLU) 75 mg capsule Take 1 Cap by mouth two (2) times a day for 5 days.     ergocalciferol (ERGOCALCIFEROL) 1,250 mcg (50,000 unit) capsule TAKE ONE CAPSULE BY MOUTH ONE TIME PER WEEK    levothyroxine (SYNTHROID) 50 mcg tablet TAKE 1 TABLET BY MOUTH EVERY DAY BEFORE BREAKFAST    amLODIPine (NORVASC) 10 mg tablet TAKE 1 TABLET BY MOUTH EVERY DAY    gabapentin (NEURONTIN) 600 mg tablet Take 1 Tab by mouth three (3) times daily.  ALPRAZolam (XANAX) 1 mg tablet Take 1 Tab by mouth three (3) times daily as needed for Anxiety. Max Daily Amount: 3 mg.  citalopram (CELEXA) 20 mg tablet Take 1 Tab by mouth daily.  ascorbic acid, vitamin C, (VITAMIN C) 500 mg tablet Take 1,000 mg by mouth.  flavoxATE (URISPAS) 100 mg tablet Take 100 mg by mouth three (3) times daily as needed.  scopolamine (TRANSDERM-SCOP) 1 mg over 3 days pt3d 1 Patch by TransDERmal route every seventy-two (72) hours.  clotrimazole-betamethasone (LOTRISONE) topical cream Apply thin layer to affected area TWICE DAILY AS NEEDED    conjugated estrogens (PREMARIN) 0.3 mg tablet Take 0.3 mg by mouth daily.  vitamin E (AQUA GEMS) 400 unit capsule Take 800 Units by mouth daily.  cyanocobalamin 1,000 mcg tablet Take 1,000 mcg by mouth daily.  fluticasone (FLONASE) 50 mcg/actuation nasal spray 2 sprays each nostril daily (Patient taking differently: as needed. 2 sprays each nostril daily)    omeprazole (PRILOSEC) 20 mg capsule Take 20 mg by mouth daily. No current facility-administered medications for this visit.       Allergies   Allergen Reactions    Crestor [Rosuvastatin] Myalgia    Lipitor [Atorvastatin] Myalgia    Mevacor [Lovastatin] Myalgia    Mobic [Meloxicam] Nausea Only and Other (comments)     headache    Niaspan [Niacin] Other (comments)     flushing    Percocet [Oxycodone-Acetaminophen] Other (comments)    Pravachol [Pravastatin] Myalgia    Tricor [Fenofibrate Micronized] Other (comments)     Elevated LFTs    Zetia [Ezetimibe] Other (comments)     gassiness    Zocor [Simvastatin] Myalgia     REVIEW OF SYSTEMS: mammo 11/19, Dr Cordova Pel 2019, colo 8/17 Dr Jocelyn Yanez  no vision change or eye pain  Oral  no mouth pain, tongue or tooth problems  Ears  no hearing loss, ear pain, fullness, no swallowing problems  Cardiac  no CP, PND, orthopnea, edema, palpitations or syncope  Chest  no breast masses  GI  no heartburn, vomiting, change in bowel habits, bleeding, hemorrhoids  Urinary  no dysuria, hematuria, flank pain, urgency, frequency    Visit Vitals  /80   Pulse 72   Temp 99 °F (37.2 °C) (Oral)   Resp 14   Ht 5' (1.524 m)   Wt 160 lb (72.6 kg)   SpO2 95%   BMI 31.25 kg/m²   A&O x3  Affect is appropriate. Mood stable  No apparent distress  Anicteric, no JVD, adenopathy or thyromegaly. sinuses nt with boggy mucosa, op benign  No carotid bruits or radiated murmur  Lungs cta  Heart showed regular rate and rhythm. No murmur, rubs, gallops  Abdomen soft nontender, no hepatosplenomegaly or masses. Extremities without edema.   Pulses 1-2+ symmetrically    LABS  From 10/11 showed ldl-p 2955  chol 199, tg 390, hdl 39, ldl-c 82  From  2/12  showed ldl-p 2029, chol 223, tg 244, hdl 38, ldl-c 136,  gluc 90, cr 0.70,      alt 33  From  6/12  showed ldl-p 2427, chol 221, tg 196, hdl 45, ldl-c 137  From 10/12 showed ldl-p 2056, chol 193, tg 261, hdl 37, ldl-c 104,        tsh 1.56  From 2/13 showed       chol 164, tg 190, hdl 44, ldl-c 113  From 9/13 showed       chol 201, tg 195, hdl 38, ldl-c 124, gluc 89, cr 0.70, gfr 104, alt 18,  tsh 1.48,                   vit d 35.1  From 3/14 showed       chol 213, tg 269, hdl 40, ldl-c 119, gluc 96, cr 0.63, gfr>60,  alt 17  From 7/14 showed       chol 216, tg 237, hdl 38, ldl-c 131, gluc 87, cr 0.60, gfr>60,  alt 10, tsh 1.29  From 1/15 showed       chol 210, tg 305, hdl 40, ldl-c 109, gluc 89, cr 0.62, gfr>60,  alt 11  From 7/15 showed       chol 209, tg 231, hdl 45, ldl-c 118, gluc 89, cr 0.70, gfr>60,  alt 15, tsh 3.23, wbc 6.7, hb 14.6, plt 294  From 1/16 showed       chol 212, tg 314, hdl 40, ldl-c 109,                         hep c neg  From 7/16 showed       chol 218, tg 253, hdl 37, ldl-c 130, gluc 88, cr 0.61, gfr 106, alt 27, tsh 2.71  From 1/17 showed       chol 218, tg 229, hdl 46, ldl-c 126,            wbc 6.8, hb 16.5, plt 321, vit d 21.1  From 7/17 showed       chol 242, tg 184, hdl 45, ldl-c 160,        tsh 2.04,                   vit d 40.2  From 1/18 showed       chol 244, tg 253, hdl 44, ldl-c 149, gluc 90, cr 0.64, gfr 103, alt 28,                   vit d 37.5  From 1/19 showed       chol 265, tg 230, hdl 47, ldl-c 172, gluc 96, cr 0.67, gfr 101, alt 24, tsh 2.62, wbc 5.9, hb 14.3, plt 346, ft4 1.13  From 1/20 showed       chol 253, tg 237, hdl 46, ldl-c 160, gluc 86, cr 0.57, gfr 105, alt 22, tsh 1.92, wbc 5.2, hb 14.4, plt 303, ft4 1.15, vit d 35.0    Results for orders placed or performed in visit on 02/04/20   AMB POC RAPID INFLUENZA TEST   Result Value Ref Range    VALID INTERNAL CONTROL POC Yes     QuickVue Influenza test Negative Negative     Patient Active Problem List   Diagnosis Code    Nephrolithiasis Dr. Masha Lobato N20.0    Hypovitaminosis D E55.9    Dyslipidemia E78.5    Hypothyroidism due to acquired atrophy of thyroid E03.4    Obesity (BMI 30-39. 9) E66.9    Colon adenoma 3/16 Dr Elida Coronado D12.6    Mild episode of recurrent major depressive disorder ([facility] [address] F33.0    Anxiety F41.9     Assessment and plan:  1. HLP but low Ca score. Will stay on dietary and lifestyle measures for now. Intol 5 statins. 2. Hypovit d. Continue current regimen. 3. HTN. Continue current  4. Hypothyroidism. Therapeutic   5. Obesity. IF as she is doing, restart exercise once better, low carb  6. Anxiety. Prn xanax, continue celexa   7. Colon adenoma. Fiber, colo 2021  8. Bladder spasm. Prn urispas  9. Ortho. Call for ortho referral if no improvement  10. Flu like syndrome. Empiric tamiflu given, prn otc meds also        RTC 1/21      Above conditions discussed at length and patient vocalized understanding. All questions answered to patient satisfaction      Lipid lowering therapy not prescibed for medical reasons.

## 2020-02-01 DIAGNOSIS — E55.9 HYPOVITAMINOSIS D: ICD-10-CM

## 2020-02-03 RX ORDER — ERGOCALCIFEROL 1.25 MG/1
CAPSULE ORAL
Qty: 4 CAP | Refills: 11 | Status: SHIPPED | OUTPATIENT
Start: 2020-02-03 | End: 2020-12-17 | Stop reason: SDUPTHER

## 2020-02-04 ENCOUNTER — OFFICE VISIT (OUTPATIENT)
Dept: INTERNAL MEDICINE CLINIC | Age: 55
End: 2020-02-04

## 2020-02-04 VITALS
HEIGHT: 60 IN | OXYGEN SATURATION: 95 % | BODY MASS INDEX: 31.41 KG/M2 | WEIGHT: 160 LBS | TEMPERATURE: 99 F | SYSTOLIC BLOOD PRESSURE: 112 MMHG | DIASTOLIC BLOOD PRESSURE: 80 MMHG | HEART RATE: 72 BPM | RESPIRATION RATE: 14 BRPM

## 2020-02-04 DIAGNOSIS — E66.9 OBESITY (BMI 30-39.9): ICD-10-CM

## 2020-02-04 DIAGNOSIS — D12.6 COLON ADENOMA: ICD-10-CM

## 2020-02-04 DIAGNOSIS — E03.4 HYPOTHYROIDISM DUE TO ACQUIRED ATROPHY OF THYROID: ICD-10-CM

## 2020-02-04 DIAGNOSIS — F33.0 MILD EPISODE OF RECURRENT MAJOR DEPRESSIVE DISORDER (HCC): ICD-10-CM

## 2020-02-04 DIAGNOSIS — R68.89 FLU-LIKE SYMPTOMS: ICD-10-CM

## 2020-02-04 DIAGNOSIS — Z00.00 PHYSICAL EXAM: Primary | ICD-10-CM

## 2020-02-04 DIAGNOSIS — F41.9 ANXIETY: ICD-10-CM

## 2020-02-04 DIAGNOSIS — E78.5 DYSLIPIDEMIA: ICD-10-CM

## 2020-02-04 LAB
QUICKVUE INFLUENZA TEST: NEGATIVE
VALID INTERNAL CONTROL?: YES

## 2020-02-04 RX ORDER — OSELTAMIVIR PHOSPHATE 75 MG/1
75 CAPSULE ORAL 2 TIMES DAILY
Qty: 10 CAP | Refills: 0 | Status: SHIPPED | OUTPATIENT
Start: 2020-02-04 | End: 2020-02-09

## 2020-02-04 NOTE — PROGRESS NOTES
Perla Long presents today for   Chief Complaint   Patient presents with    Physical     labs              Depression Screening:  No flowsheet data found. Learning Assessment:  Learning Assessment 7/27/2017   PRIMARY LEARNER Patient   HIGHEST LEVEL OF EDUCATION - PRIMARY LEARNER  GRADUATED HIGH SCHOOL OR GED   BARRIERS PRIMARY LEARNER -   PRIMARY LANGUAGE ENGLISH   LEARNER PREFERENCE PRIMARY DEMONSTRATION   ANSWERED BY patient   RELATIONSHIP SELF       Abuse Screening:  Abuse Screening Questionnaire 7/27/2017   Do you ever feel afraid of your partner? N   Are you in a relationship with someone who physically or mentally threatens you? N   Is it safe for you to go home? Y       Fall Risk  No flowsheet data found. Coordination of Care:  1. Have you been to the ER, urgent care clinic since your last visit? Hospitalized since your last visit? Yes [facility]    2. Have you seen or consulted any other health care providers outside of the [address] [place] [address] since your last visit? Include any pap smears or colon screening.  no

## 2020-02-05 DIAGNOSIS — G62.9 NEUROPATHY: ICD-10-CM

## 2020-02-05 RX ORDER — GABAPENTIN 600 MG/1
600 TABLET ORAL 3 TIMES DAILY
Qty: 270 TAB | Refills: 1 | Status: SHIPPED | OUTPATIENT
Start: 2020-02-05 | End: 2020-09-20

## 2020-02-05 NOTE — TELEPHONE ENCOUNTER
[facility] for Neurontin as 12/15/19 for a 30 d/s. UDS done 8/5/19  CSA signed 8/5/19    Last Visit: 2/4/20 with MD Savana Lucas  Next Appointment: 2/9/21 with MD Savana Lucas  Previous Refill Encounter(s): 8/2/19 #270 with 1 refill    Requested Prescriptions     Pending Prescriptions Disp Refills    gabapentin (NEURONTIN) 600 mg tablet 270 Tab 1     Sig: Take 1 Tab by mouth three (3) times daily.

## 2020-03-27 RX ORDER — CITALOPRAM 20 MG/1
TABLET, FILM COATED ORAL
Qty: 90 TAB | Refills: 3 | Status: SHIPPED | OUTPATIENT
Start: 2020-03-27 | End: 2020-12-21 | Stop reason: DRUGHIGH

## 2020-05-01 DIAGNOSIS — F41.9 ANXIETY: ICD-10-CM

## 2020-05-01 RX ORDER — ALPRAZOLAM 1 MG/1
1 TABLET ORAL
Qty: 40 TAB | Refills: 1 | Status: SHIPPED | OUTPATIENT
Start: 2020-05-01

## 2020-05-01 NOTE — TELEPHONE ENCOUNTER
[facility] date for Xanax as 6/17/19 for a 13 d/s. Last Visit: 2/4/20 with MD Amelie Barrios  Next Appointment: 2/9/21 with MD Amelie Barrios  Previous Refill Encounter(s): 6/7/19 #40 with 1 refill    Requested Prescriptions     Pending Prescriptions Disp Refills    ALPRAZolam (XANAX) 1 mg tablet 40 Tab 1     Sig: Take 1 Tab by mouth three (3) times daily as needed for Anxiety. Max Daily Amount: 3 mg.

## 2020-05-04 ENCOUNTER — TELEPHONE (OUTPATIENT)
Dept: INTERNAL MEDICINE CLINIC | Age: 55
End: 2020-05-04

## 2020-05-04 ENCOUNTER — LAB ONLY (OUTPATIENT)
Dept: INTERNAL MEDICINE CLINIC | Age: 55
End: 2020-05-04

## 2020-05-04 ENCOUNTER — VIRTUAL VISIT (OUTPATIENT)
Dept: INTERNAL MEDICINE CLINIC | Age: 55
End: 2020-05-04

## 2020-05-04 DIAGNOSIS — R30.0 DYSURIA: Primary | ICD-10-CM

## 2020-05-04 DIAGNOSIS — R39.9 URINARY SYMPTOM OR SIGN: Primary | ICD-10-CM

## 2020-05-04 DIAGNOSIS — Z00.00 PHYSICAL EXAM: ICD-10-CM

## 2020-05-04 LAB
BILIRUB UR QL STRIP: NEGATIVE
GLUCOSE UR-MCNC: NEGATIVE MG/DL
KETONES P FAST UR STRIP-MCNC: NEGATIVE MG/DL
PH UR STRIP: 6 [PH] (ref 4.6–8)
PROT UR QL STRIP: NEGATIVE
SP GR UR STRIP: 1.02 (ref 1–1.03)
UA UROBILINOGEN AMB POC: NORMAL (ref 0.2–1)
URINALYSIS CLARITY POC: CLEAR
URINALYSIS COLOR POC: YELLOW
URINE BLOOD POC: NEGATIVE
URINE LEUKOCYTES POC: NEGATIVE
URINE NITRITES POC: NEGATIVE

## 2020-05-04 NOTE — PROGRESS NOTES
Rom Marina is a 47 y.o. female evaluated via telephone on 5/4/2020. Consent:  She and/or health care decision maker is aware that she may receive a bill for this telephone service, depending on her insurance coverage, and has provided verbal consent to proceed: Yes      Documentation:  I communicated with the patient and/or health care decision maker about pressure. Details of this discussion including any medical advice provided: see note      I affirm this is a Patient Initiated Episode with a Patient who has not had a related appointment within my department in the past 7 days or scheduled within the next 24 hours. Total Time: minutes: 5-10 minutes    Note: not billable if this call serves to triage the patient into an appointment for the relevant concern      Pilar Ramirez MD       47 y.o. WHITE OR  female who presents for evaluation. The last few days, she has been having a pressure and fullness sensation in her groin/bladder region. Denies any urgency, frequency, dysuria, hematuria, fevers, nausea, vomiting, diarrhea, vaginal complaints. She just wanted to be checked for UTI. She admits that she has not been drinking enough fluids. Under a lot of stress with her family also. Denied any incontinence issues.     Past Medical History:   Diagnosis Date    Agatston CAC score, <100 08/2017    ca score zero    Allergic rhinitis     Anxiety     Colon adenoma 3/16    Dr Julio Patel    Depression     Dyslipidemia     intol 5 statins/tricor    Fatty liver     negative serologies 2004; Fib-4 was 0.3 from 7/15    FHx: heart disease     GERD (gastroesophageal reflux disease)     Hypertension     Hypothyroidism     Hypovitaminosis D     Migraines     CT head negative 2004    Nephrolithiasis 1999    Neuropathy 2006    on EMG Dr. Simón Rodriguez Obesity     peak weight 163 lbs, bmi 31.3 from 2/15; dec johnathan suppressants, med supervised wt loss; IF 1/19 start weight 163 lbs    Pulmonary nodule 04/2006    no change 8/17    Seasonal allergies     Tension headache      Current Outpatient Medications   Medication Sig    ALPRAZolam (XANAX) 1 mg tablet Take 1 Tab by mouth three (3) times daily as needed for Anxiety. Max Daily Amount: 3 mg.  citalopram (CELEXA) 20 mg tablet TAKE 1 TABLET BY MOUTH EVERY DAY    gabapentin (NEURONTIN) 600 mg tablet Take 1 Tab by mouth three (3) times daily.  ergocalciferol (ERGOCALCIFEROL) 1,250 mcg (50,000 unit) capsule TAKE ONE CAPSULE BY MOUTH ONE TIME PER WEEK    levothyroxine (SYNTHROID) 50 mcg tablet TAKE 1 TABLET BY MOUTH EVERY DAY BEFORE BREAKFAST    amLODIPine (NORVASC) 10 mg tablet TAKE 1 TABLET BY MOUTH EVERY DAY    ascorbic acid, vitamin C, (VITAMIN C) 500 mg tablet Take 1,000 mg by mouth.  flavoxATE (URISPAS) 100 mg tablet Take 100 mg by mouth three (3) times daily as needed.  scopolamine (TRANSDERM-SCOP) 1 mg over 3 days pt3d 1 Patch by TransDERmal route every seventy-two (72) hours.  clotrimazole-betamethasone (LOTRISONE) topical cream Apply thin layer to affected area TWICE DAILY AS NEEDED    conjugated estrogens (PREMARIN) 0.3 mg tablet Take 0.3 mg by mouth daily.  vitamin E (AQUA GEMS) 400 unit capsule Take 800 Units by mouth daily.  cyanocobalamin 1,000 mcg tablet Take 1,000 mcg by mouth daily.  fluticasone (FLONASE) 50 mcg/actuation nasal spray 2 sprays each nostril daily (Patient taking differently: as needed. 2 sprays each nostril daily)    omeprazole (PRILOSEC) 20 mg capsule Take 20 mg by mouth daily. No current facility-administered medications for this visit.       Allergies   Allergen Reactions    Crestor [Rosuvastatin] Myalgia    Lipitor [Atorvastatin] Myalgia    Mevacor [Lovastatin] Myalgia    Mobic [Meloxicam] Nausea Only and Other (comments)     headache    Niaspan [Niacin] Other (comments)     flushing    Percocet [Oxycodone-Acetaminophen] Other (comments)    Pravachol [Pravastatin] Myalgia  Tricor [Fenofibrate Micronized] Other (comments)     Elevated LFTs    Zetia [Ezetimibe] Other (comments)     gassiness    Zocor [Simvastatin] Myalgia     Results for orders placed or performed in visit on 05/04/20   AMB POC URINALYSIS DIP STICK AUTO W/O MICRO   Result Value Ref Range    Color (UA POC) Yellow     Clarity (UA POC) Clear     Glucose (UA POC) Negative Negative    Bilirubin (UA POC) Negative Negative    Ketones (UA POC) Negative Negative    Specific gravity (UA POC) 1.025 1.001 - 1.035    Blood (UA POC) Negative Negative    pH (UA POC) 6.0 4.6 - 8.0    Protein (UA POC) Negative Negative    Urobilinogen (UA POC) 0.2 mg/dL 0.2 - 1    Nitrites (UA POC) Negative Negative    Leukocyte esterase (UA POC) Negative Negative     Assessment and plan:  1. Urinary symptoms etiology unclear. Will check urine culture, in the meantime observation for now and drink plenty of fluids. Call with an update      Above conditions discussed at length and patient vocalized understanding.   All questions answered to patient satisfaction

## 2020-05-04 NOTE — TELEPHONE ENCOUNTER
Pt calling asking RD to call in rx for uti. She has frequency and burning for 24 hours. Has not tried anything otc. Please advise if you want her to come in for a ua.

## 2020-05-05 LAB
ALBUMIN SERPL-MCNC: 4.4 G/DL (ref 3.8–4.9)
ALBUMIN/GLOB SERPL: 1.8 {RATIO} (ref 1.2–2.2)
ALP SERPL-CCNC: 66 IU/L (ref 39–117)
ALT SERPL-CCNC: 15 IU/L (ref 0–32)
APPEARANCE UR: ABNORMAL
AST SERPL-CCNC: 11 IU/L (ref 0–40)
BILIRUB SERPL-MCNC: 0.4 MG/DL (ref 0–1.2)
BILIRUB UR QL STRIP: NEGATIVE
BUN SERPL-MCNC: 12 MG/DL (ref 6–24)
BUN/CREAT SERPL: 18 (ref 9–23)
CALCIUM SERPL-MCNC: 9.5 MG/DL (ref 8.7–10.2)
CHLORIDE SERPL-SCNC: 98 MMOL/L (ref 96–106)
CHOLEST SERPL-MCNC: 259 MG/DL (ref 100–199)
CO2 SERPL-SCNC: 24 MMOL/L (ref 20–29)
COLOR UR: YELLOW
CREAT SERPL-MCNC: 0.68 MG/DL (ref 0.57–1)
ERYTHROCYTE [DISTWIDTH] IN BLOOD BY AUTOMATED COUNT: 13 % (ref 11.7–15.4)
GLOBULIN SER CALC-MCNC: 2.5 G/DL (ref 1.5–4.5)
GLUCOSE SERPL-MCNC: 81 MG/DL (ref 65–99)
GLUCOSE UR QL: NEGATIVE
HCT VFR BLD AUTO: 42.6 % (ref 34–46.6)
HDLC SERPL-MCNC: 51 MG/DL
HGB BLD-MCNC: 14.5 G/DL (ref 11.1–15.9)
HGB UR QL STRIP: NEGATIVE
INTERPRETATION, 910389: NORMAL
KETONES UR QL STRIP: ABNORMAL
LDLC SERPL CALC-MCNC: 160 MG/DL (ref 0–99)
LEUKOCYTE ESTERASE UR QL STRIP: NEGATIVE
MCH RBC QN AUTO: 32.4 PG (ref 26.6–33)
MCHC RBC AUTO-ENTMCNC: 34 G/DL (ref 31.5–35.7)
MCV RBC AUTO: 95 FL (ref 79–97)
MICRO URNS: ABNORMAL
NITRITE UR QL STRIP: NEGATIVE
PH UR STRIP: 5.5 [PH] (ref 5–7.5)
PLATELET # BLD AUTO: 310 X10E3/UL (ref 150–450)
POTASSIUM SERPL-SCNC: 4.4 MMOL/L (ref 3.5–5.2)
PROT SERPL-MCNC: 6.9 G/DL (ref 6–8.5)
PROT UR QL STRIP: ABNORMAL
RBC # BLD AUTO: 4.48 X10E6/UL (ref 3.77–5.28)
SODIUM SERPL-SCNC: 138 MMOL/L (ref 134–144)
SP GR UR: 1.03 (ref 1–1.03)
T4 FREE SERPL-MCNC: 1.24 NG/DL (ref 0.82–1.77)
TRIGL SERPL-MCNC: 241 MG/DL (ref 0–149)
TSH SERPL DL<=0.005 MIU/L-ACNC: 2.41 UIU/ML (ref 0.45–4.5)
UROBILINOGEN UR STRIP-MCNC: 0.2 MG/DL (ref 0.2–1)
VLDLC SERPL CALC-MCNC: 48 MG/DL (ref 5–40)
WBC # BLD AUTO: 6.4 X10E3/UL (ref 3.4–10.8)

## 2020-05-06 LAB — BACTERIA UR CULT: NO GROWTH

## 2020-05-07 ENCOUNTER — TELEPHONE (OUTPATIENT)
Dept: INTERNAL MEDICINE CLINIC | Age: 55
End: 2020-05-07

## 2020-07-06 ENCOUNTER — TELEPHONE (OUTPATIENT)
Dept: INTERNAL MEDICINE CLINIC | Age: 55
End: 2020-07-06

## 2020-07-06 DIAGNOSIS — G89.29 CHRONIC PAIN OF BOTH KNEES: Primary | ICD-10-CM

## 2020-07-06 DIAGNOSIS — M25.562 CHRONIC PAIN OF BOTH KNEES: Primary | ICD-10-CM

## 2020-07-06 DIAGNOSIS — M25.561 CHRONIC PAIN OF BOTH KNEES: Primary | ICD-10-CM

## 2020-07-06 NOTE — TELEPHONE ENCOUNTER
Pt asking for a referral to be seen at [facility]  Dr Jessenia Shields - [phone] for bursitis in her knees

## 2020-07-07 ENCOUNTER — TELEPHONE (OUTPATIENT)
Dept: INTERNAL MEDICINE CLINIC | Age: 55
End: 2020-07-07

## 2020-07-07 DIAGNOSIS — M25.561 CHRONIC PAIN OF BOTH KNEES: Primary | ICD-10-CM

## 2020-07-07 DIAGNOSIS — G89.29 CHRONIC PAIN OF BOTH KNEES: Primary | ICD-10-CM

## 2020-07-07 DIAGNOSIS — M25.562 CHRONIC PAIN OF BOTH KNEES: Primary | ICD-10-CM

## 2020-07-07 NOTE — TELEPHONE ENCOUNTER
Pt called said Dr Maria Isabel Vasquez office can not see her until mid August. She was able to get an appt 07/09/ with DR Eli Stein and orthopedic and needs a referral sent to them - [phone]

## 2020-07-08 NOTE — TELEPHONE ENCOUNTER
Dr Violette oMreno name is Randy Gaytan , he is at [facility]    Phone # [phone]   Fax# [phone]

## 2020-08-04 RX ORDER — IBUPROFEN 800 MG/1
TABLET ORAL
COMMUNITY

## 2020-08-04 RX ORDER — INDOMETHACIN 50 MG/1
CAPSULE ORAL 3 TIMES DAILY
COMMUNITY

## 2020-08-25 ENCOUNTER — OFFICE VISIT (OUTPATIENT)
Dept: INTERNAL MEDICINE CLINIC | Age: 55
End: 2020-08-25

## 2020-08-25 VITALS
OXYGEN SATURATION: 97 % | SYSTOLIC BLOOD PRESSURE: 128 MMHG | DIASTOLIC BLOOD PRESSURE: 94 MMHG | WEIGHT: 160 LBS | RESPIRATION RATE: 14 BRPM | BODY MASS INDEX: 31.41 KG/M2 | TEMPERATURE: 97.3 F | HEIGHT: 60 IN | HEART RATE: 86 BPM

## 2020-08-25 DIAGNOSIS — R42 DIZZINESS: Primary | ICD-10-CM

## 2020-08-25 RX ORDER — MECLIZINE HYDROCHLORIDE 25 MG/1
25 TABLET ORAL
Qty: 30 TAB | Refills: 1 | Status: SHIPPED | OUTPATIENT
Start: 2020-08-25

## 2020-08-25 NOTE — PROGRESS NOTES
47 y.o. WHITE [place]  female who presents for evaluation. She woke up this morning feeling very dizzy. Described a spinning dizziness which she would trigeer with moving her head, there was associated nausea and dry heaving. No ear pain, hearing loss, tinnitus, vision change of focal neuro complaints. Her  gave her dramamine whic helped but still having sx    Past Medical History:   Diagnosis Date    Agatston CAC score, <100 08/2017    ca score zero    Allergic rhinitis     Anxiety     Colon adenoma 3/16    Dr Светлана Tse    Depression     Dyslipidemia     intol 5 statins/tricor    Fatty liver     negative serologies 2004; Fib-4 was 0.3 from 7/15    FHx: heart disease     GERD (gastroesophageal reflux disease)     Hypertension     Hypothyroidism     Hypovitaminosis D     Migraines     CT head negative 2004    Nephrolithiasis 1999    Neuropathy 2006    on EMG Dr. Praveen Felipe Obesity     peak weight 163 lbs, bmi 31.3 from 2/15; dec johnathan suppressants, med supervised wt loss; IF 1/19 start weight 163 lbs    Pulmonary nodule 04/2006    no change 8/17    Seasonal allergies     Tension headache      Current Outpatient Medications   Medication Sig    meclizine (ANTIVERT) 25 mg tablet Take 1 Tab by mouth three (3) times daily as needed for Dizziness.  ibuprofen (MOTRIN) 800 mg tablet Take  by mouth.  indomethacin (INDOCIN) 50 mg capsule Take  by mouth three (3) times daily.  oseltamivir phosphate (OSELTAMIVIR PO) Take  by mouth.  varicella-zoster gE/AS01B/PF (SHINGRIX, PF, IM) by IntraMUSCular route.  ALPRAZolam (XANAX) 1 mg tablet Take 1 Tab by mouth three (3) times daily as needed for Anxiety. Max Daily Amount: 3 mg.  citalopram (CELEXA) 20 mg tablet TAKE 1 TABLET BY MOUTH EVERY DAY    gabapentin (NEURONTIN) 600 mg tablet Take 1 Tab by mouth three (3) times daily.     ergocalciferol (ERGOCALCIFEROL) 1,250 mcg (50,000 unit) capsule TAKE ONE CAPSULE BY MOUTH ONE TIME PER WEEK    levothyroxine (SYNTHROID) 50 mcg tablet TAKE 1 TABLET BY MOUTH EVERY DAY BEFORE BREAKFAST    amLODIPine (NORVASC) 10 mg tablet TAKE 1 TABLET BY MOUTH EVERY DAY    ascorbic acid, vitamin C, (VITAMIN C) 500 mg tablet Take 1,000 mg by mouth.  flavoxATE (URISPAS) 100 mg tablet Take 100 mg by mouth three (3) times daily as needed.  scopolamine (TRANSDERM-SCOP) 1 mg over 3 days pt3d 1 Patch by TransDERmal route every seventy-two (72) hours.  clotrimazole-betamethasone (LOTRISONE) topical cream Apply thin layer to affected area TWICE DAILY AS NEEDED    conjugated estrogens (PREMARIN) 0.3 mg tablet Take 0.3 mg by mouth daily.  vitamin E (AQUA GEMS) 400 unit capsule Take 800 Units by mouth daily.  cyanocobalamin 1,000 mcg tablet Take 1,000 mcg by mouth daily.  fluticasone (FLONASE) 50 mcg/actuation nasal spray 2 sprays each nostril daily (Patient taking differently: as needed. 2 sprays each nostril daily)    omeprazole (PRILOSEC) 20 mg capsule Take 20 mg by mouth daily. No current facility-administered medications for this visit. Allergies   Allergen Reactions    Crestor [Rosuvastatin] Myalgia    Lipitor [Atorvastatin] Myalgia    Mevacor [Lovastatin] Myalgia    Mobic [Meloxicam] Nausea Only and Other (comments)     headache    Niaspan [Niacin] Other (comments)     flushing    Percocet [Oxycodone-Acetaminophen] Other (comments)    Pravachol [Pravastatin] Myalgia    Tricor [Fenofibrate Micronized] Other (comments)     Elevated LFTs    Zetia [Ezetimibe] Other (comments)     gassiness    Zocor [Simvastatin] Myalgia     Visit Vitals  BP (!) 128/94   Pulse 86   Temp 97.3 °F (36.3 °C) (Temporal)   Resp 14   Ht 5' (1.524 m)   Wt 160 lb (72.6 kg)   SpO2 97%   BMI 31.25 kg/m²   tm wnl, sinuses nt, op benign, no nods, lungs cta, heart showed rrr. abd soft and nt, neuro non focal  nasreen-hallpike positive right side down, associated nystagmus noted    Assessment and plan:  1. Vertigo. Long discussion, prn antivert given, call if no better      Above conditions discussed at length and patient vocalized understanding.   All questions answered to patient satisfaction

## 2020-09-04 RX ORDER — AMLODIPINE BESYLATE 10 MG/1
TABLET ORAL
Qty: 90 TAB | Refills: 3 | Status: SHIPPED | OUTPATIENT
Start: 2020-09-04

## 2020-09-19 DIAGNOSIS — G62.9 NEUROPATHY: ICD-10-CM

## 2020-09-20 RX ORDER — LEVOTHYROXINE SODIUM 50 UG/1
TABLET ORAL
Qty: 90 TAB | Refills: 3 | Status: SHIPPED | OUTPATIENT
Start: 2020-09-20

## 2020-09-20 RX ORDER — GABAPENTIN 600 MG/1
TABLET ORAL
Qty: 90 TAB | Refills: 3 | Status: SHIPPED | OUTPATIENT
Start: 2020-09-20

## 2020-09-21 DIAGNOSIS — G62.9 NEUROPATHY: ICD-10-CM

## 2020-09-21 RX ORDER — GABAPENTIN 600 MG/1
TABLET ORAL
Qty: 90 TAB | Refills: 3 | Status: CANCELLED | OUTPATIENT
Start: 2020-09-21

## 2020-10-07 ENCOUNTER — OFFICE VISIT (OUTPATIENT)
Dept: INTERNAL MEDICINE CLINIC | Age: 55
End: 2020-10-07
Payer: COMMERCIAL

## 2020-10-07 DIAGNOSIS — Z23 NEEDS FLU SHOT: Primary | ICD-10-CM

## 2020-10-07 PROCEDURE — 90471 IMMUNIZATION ADMIN: CPT | Performed by: INTERNAL MEDICINE

## 2020-10-07 PROCEDURE — 90686 IIV4 VACC NO PRSV 0.5 ML IM: CPT | Performed by: INTERNAL MEDICINE

## 2020-12-17 DIAGNOSIS — E55.9 HYPOVITAMINOSIS D: ICD-10-CM

## 2020-12-18 RX ORDER — ERGOCALCIFEROL 1.25 MG/1
50000 CAPSULE ORAL
Qty: 12 CAP | Refills: 3 | Status: SHIPPED | OUTPATIENT
Start: 2020-12-18

## 2020-12-21 ENCOUNTER — TELEPHONE (OUTPATIENT)
Dept: INTERNAL MEDICINE CLINIC | Age: 55
End: 2020-12-21

## 2020-12-21 DIAGNOSIS — F41.9 ANXIETY: Primary | ICD-10-CM

## 2020-12-21 DIAGNOSIS — F33.0 MILD EPISODE OF RECURRENT MAJOR DEPRESSIVE DISORDER (HCC): ICD-10-CM

## 2020-12-21 RX ORDER — CITALOPRAM 40 MG/1
40 TABLET, FILM COATED ORAL DAILY
Qty: 90 TAB | Refills: 3 | Status: SHIPPED | OUTPATIENT
Start: 2020-12-21

## 2020-12-21 NOTE — TELEPHONE ENCOUNTER
Spoke with pt via phone 12/21/20 820am    Very very stressed. Her mom's dementia is progressing and was recently admitted to [facility] for covid pneumonia. Wants guidance regarding medication management. She is already on Celexa 20, suggested she go to 40 mg. She is on Xanax 1 mg 3 times daily also, she is trying to minimize that.   Call with an update

## 2021-01-29 ENCOUNTER — TRANSCRIBE ORDER (OUTPATIENT)
Dept: SCHEDULING | Age: 56
End: 2021-01-29

## 2021-01-29 DIAGNOSIS — Z12.31 VISIT FOR SCREENING MAMMOGRAM: Primary | ICD-10-CM

## 2021-02-01 DIAGNOSIS — Z00.00 PHYSICAL EXAM: ICD-10-CM

## 2021-06-08 ENCOUNTER — HOSPITAL ENCOUNTER (OUTPATIENT)
Dept: MAMMOGRAPHY | Age: 56
Discharge: HOME | End: 2021-06-08
Attending: OBSTETRICS & GYNECOLOGY
Payer: COMMERCIAL

## 2021-06-08 DIAGNOSIS — Z12.31 VISIT FOR SCREENING MAMMOGRAM: ICD-10-CM

## 2021-06-08 PROCEDURE — 77063 BREAST TOMOSYNTHESIS BI: CPT

## 2022-03-20 PROBLEM — F41.9 ANXIETY: Status: ACTIVE | Noted: 2018-01-29

## 2022-03-20 PROBLEM — F33.0 MILD EPISODE OF RECURRENT MAJOR DEPRESSIVE DISORDER (HCC): Status: ACTIVE | Noted: 2018-01-24

## 2022-07-18 ENCOUNTER — TRANSCRIBE ORDER (OUTPATIENT)
Dept: SCHEDULING | Age: 57
End: 2022-07-18

## 2022-07-18 DIAGNOSIS — Z12.31 VISIT FOR SCREENING MAMMOGRAM: Primary | ICD-10-CM

## 2022-07-27 ENCOUNTER — HOSPITAL ENCOUNTER (OUTPATIENT)
Dept: MAMMOGRAPHY | Age: 57
Discharge: HOME | End: 2022-07-27
Attending: OBSTETRICS & GYNECOLOGY
Payer: COMMERCIAL

## 2022-07-27 DIAGNOSIS — Z12.31 VISIT FOR SCREENING MAMMOGRAM: ICD-10-CM

## 2022-07-27 PROCEDURE — 77063 BREAST TOMOSYNTHESIS BI: CPT

## 2022-12-22 ENCOUNTER — HOSPITAL ENCOUNTER (OUTPATIENT)
Dept: GENERAL RADIOLOGY | Age: 57
End: 2022-12-22
Payer: COMMERCIAL

## 2022-12-22 ENCOUNTER — TRANSCRIBE ORDER (OUTPATIENT)
Dept: REGISTRATION | Age: 57
End: 2022-12-22

## 2022-12-22 ENCOUNTER — HOSPITAL ENCOUNTER (OUTPATIENT)
Dept: LAB | Age: 57
End: 2022-12-22
Payer: COMMERCIAL

## 2022-12-22 ENCOUNTER — HOSPITAL ENCOUNTER (OUTPATIENT)
Dept: PREADMISSION TESTING | Age: 57
Discharge: HOME | End: 2022-12-22
Payer: COMMERCIAL

## 2022-12-22 DIAGNOSIS — D48.1 NEOPLASM OF UNCERTAIN BEHAVIOR OF CONNECTIVE AND OTHER SOFT TISSUE: ICD-10-CM

## 2022-12-22 DIAGNOSIS — D48.1 NEOPLASM OF UNCERTAIN BEHAVIOR OF CONNECTIVE AND OTHER SOFT TISSUE: Primary | ICD-10-CM

## 2022-12-22 LAB
ATRIAL RATE: 85 BPM
CALCULATED P AXIS, ECG09: 63 DEGREES
CALCULATED R AXIS, ECG10: -6 DEGREES
CALCULATED T AXIS, ECG11: 36 DEGREES
DIAGNOSIS, 93000: NORMAL
P-R INTERVAL, ECG05: 170 MS
Q-T INTERVAL, ECG07: 394 MS
QRS DURATION, ECG06: 90 MS
QTC CALCULATION (BEZET), ECG08: 468 MS
SENTARA SPECIMEN COL,SENBCF: NORMAL
VENTRICULAR RATE, ECG03: 85 BPM

## 2022-12-22 PROCEDURE — 99001 SPECIMEN HANDLING PT-LAB: CPT

## 2022-12-22 PROCEDURE — 93005 ELECTROCARDIOGRAM TRACING: CPT

## 2022-12-22 PROCEDURE — 71046 X-RAY EXAM CHEST 2 VIEWS: CPT

## 2022-12-28 ENCOUNTER — CLINICAL SUPPORT (OUTPATIENT)
Dept: SURGERY | Age: 57
End: 2022-12-28

## 2022-12-28 VITALS
BODY MASS INDEX: 31.53 KG/M2 | OXYGEN SATURATION: 97 % | TEMPERATURE: 97 F | HEIGHT: 61 IN | WEIGHT: 167 LBS | DIASTOLIC BLOOD PRESSURE: 80 MMHG | HEART RATE: 86 BPM | RESPIRATION RATE: 18 BRPM | SYSTOLIC BLOOD PRESSURE: 110 MMHG

## 2022-12-28 DIAGNOSIS — Z12.11 COLON CANCER SCREENING: Primary | ICD-10-CM

## 2022-12-28 NOTE — PROGRESS NOTES
Colon Screen    Patient: Genevieve Shah MRN: [id number]  SSN: [id number]    YOB: 1965  Age: 62 y.o. Sex: female        Subjective:   Genevieve Shah was referred by Dr. Bismark Varela. PCP is Trang Hopkins MD.  Patient referred for colonoscopy for   Screening colonoscopy. Patient denies rectal pain or bleeding. Abdominal surgeries as described below, specifically c-sections  Family history as described below, specifically none. Last colonoscopy was 5 years ago with Dr. Ninoska Nicolas negative for polyps but had tubular adenoma removed in 2016.     Allergies   Allergen Reactions    Crestor [Rosuvastatin] Myalgia    Lipitor [Atorvastatin] Myalgia    Mevacor [Lovastatin] Myalgia    Mobic [Meloxicam] Nausea Only and Other (comments)     headache    Niaspan [Niacin] Other (comments)     flushing    Percocet [Oxycodone-Acetaminophen] Other (comments)    Pravachol [Pravastatin] Myalgia    Tricor [Fenofibrate Micronized] Other (comments)     Elevated LFTs    Zetia [Ezetimibe] Other (comments)     gassiness    Zocor [Simvastatin] Myalgia       Past Medical History:   Diagnosis Date    Agatston CAC score, <100 08/2017    ca score zero    Allergic rhinitis     Anxiety     Colon adenoma 3/16    Dr Mingo Hines    Depression     Dyslipidemia     intol 5 statins/tricor    Fatty liver     negative serologies 2004; Fib-4 was 0.3 from 7/15    FHx: heart disease     GERD (gastroesophageal reflux disease)     Hypertension     Hypothyroidism     Hypovitaminosis D     Migraines     CT head negative 2004    Nephrolithiasis 1999    Neuropathy 2006    on EMG Dr. Zack Roach    Obesity     peak weight 163 lbs, bmi 31.3 from 2/15; dec johnathan suppressants, med supervised wt loss; IF 1/19 start weight 163 lbs    Pulmonary nodule 04/2006    no change 8/17    Seasonal allergies     Tension headache      Past Surgical History:   Procedure Laterality Date    COLONOSCOPY N/A 8/30/2017    Dr. 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    HX ORTHOPAEDIC      left foot    HX TONSILLECTOMY      HX TOTAL VAGINAL HYSTERECTOMY      HX TUBAL LIGATION      bilateral    VASCULAR SURGERY PROCEDURE UNLIST      NADEGE 1.29/1.17      Family History   Problem Relation Age of Onset    Hypertension Mother     Diabetes Mother     Elevated Lipids Father     Colon Polyps Father     Hypertension Sister     Diabetes Maternal Grandmother     Stroke Maternal Grandmother     Heart Disease Paternal Grandmother     Hypertension Paternal Grandmother     Heart Disease Paternal Grandfather     Hypertension Paternal Grandfather      Social History     Tobacco Use    Smoking status: Never    Smokeless tobacco: Never   Substance Use Topics    Alcohol use: No      Prior to Admission medications    Medication Sig Start Date End Date Taking? Authorizing Provider   fluticasone propionate (FLONASE NA) by Nasal route. Yes Provider, Historical   citalopram (CELEXA) 40 mg tablet Take 1 Tab by mouth daily. 20  Yes Sally Hancock MD   ergocalciferol (ERGOCALCIFEROL) 1,250 mcg (50,000 unit) capsule Take 1 Cap by mouth every seven (7) days. 20  Yes Sally Hancock MD   levothyroxine (SYNTHROID) 50 mcg tablet TAKE 1 TABLET BY MOUTH EVERY DAY BEFORE BREAKFAST 20  Yes Sally Hancock MD   gabapentin (NEURONTIN) 600 mg tablet TAKE 1 TABLET BY MOUTH THREE TIMES A DAY 20  Yes Sally Hancock MD   amLODIPine (NORVASC) 10 mg tablet TAKE 1 TABLET BY MOUTH EVERY DAY 20  Yes Sally Hancock MD   ALPRAZolam (XANAX) 1 mg tablet Take 1 Tab by mouth three (3) times daily as needed for Anxiety. Max Daily Amount: 3 mg. 20  Yes Sally Hancock MD   ascorbic acid, vitamin C, (VITAMIN C) 500 mg tablet Take 1,000 mg by mouth.    Yes Provider, Historical   clotrimazole-betamethasone (LOTRISONE) topical cream Apply thin layer to affected area TWICE DAILY AS NEEDED 18  Yes Maureen Driver MD HELLEN   conjugated estrogens (PREMARIN) 0.3 mg tablet Take 0.3 mg by mouth daily. Yes Provider, Historical   vitamin E (AQUA GEMS) 400 unit capsule Take 800 Units by mouth daily. Yes Provider, Historical   cyanocobalamin 1,000 mcg tablet Take 1,000 mcg by mouth daily. Yes Provider, Historical   omeprazole (PRILOSEC) 20 mg capsule Take 20 mg by mouth daily. Yes Provider, Historical   meclizine (ANTIVERT) 25 mg tablet Take 1 Tab by mouth three (3) times daily as needed for Dizziness. 8/25/20 12/28/22  Nino Dorantes MD   varicella-zoster gE/AS01B/PF (SHINGRIX, PF, IM) by IntraMUSCular route. Patient not taking: Reported on 12/28/2022    Provider, Historical   ibuprofen (MOTRIN) 800 mg tablet Take  by mouth.  12/28/22  Provider, Historical   indomethacin (INDOCIN) 50 mg capsule Take  by mouth three (3) times daily. 12/28/22  Provider, Historical   oseltamivir phosphate (OSELTAMIVIR PO) Take  by mouth.  12/28/22  Provider, Historical   flavoxATE (URISPAS) 100 mg tablet Take 100 mg by mouth three (3) times daily as needed. 12/28/22  Provider, Historical   scopolamine (TRANSDERM-SCOP) 1 mg over 3 days pt3d 1 Patch by TransDERmal route every seventy-two (72) hours. 1/31/19 12/28/22  Nino Dorantes MD   fluticasone (FLONASE) 50 mcg/actuation nasal spray 2 sprays each nostril daily  Patient taking differently: as needed. 2 sprays each nostril daily 8/19/15 12/28/22  Nino Dorantes MD          Review of Systems:  Review of Systems   Constitutional: Negative. HENT: Negative. Eyes: Negative. Respiratory: Negative. Cardiovascular: Negative. Gastrointestinal:  Positive for heartburn. Negative for abdominal pain, blood in stool, constipation, diarrhea, melena, nausea and vomiting. Genitourinary: Negative. Musculoskeletal:  Positive for myalgias. Negative for back pain, falls, joint pain and neck pain. Skin: Negative. Neurological:  Positive for headaches.  Negative for dizziness, tingling, tremors, sensory change, speech change, focal weakness, seizures, loss of consciousness and weakness. Endo/Heme/Allergies:  Positive for environmental allergies. Negative for polydipsia. Does not bruise/bleed easily. Psychiatric/Behavioral:  Positive for depression. Negative for hallucinations, memory loss, substance abuse and suicidal ideas. The patient is not nervous/anxious and does not have insomnia.          Risks colonoscopy described- colon injury, missed lesion, anesthesia problems, bleeding       Bernardo Guerrero RN  December 28, 2022  9:15 AM

## 2023-08-10 ENCOUNTER — HOSPITAL ENCOUNTER (OUTPATIENT)
Facility: HOSPITAL | Age: 58
Discharge: HOME | End: 2023-08-10
Payer: COMMERCIAL

## 2023-08-10 DIAGNOSIS — Z12.31 VISIT FOR SCREENING MAMMOGRAM: ICD-10-CM

## 2023-08-10 PROCEDURE — 77063 BREAST TOMOSYNTHESIS BI: CPT

## 2023-11-03 ENCOUNTER — HOSPITAL ENCOUNTER (OUTPATIENT)
Facility: HOSPITAL | Age: 58
Discharge: HOME | End: 2023-11-03
Payer: COMMERCIAL

## 2023-11-03 DIAGNOSIS — N95.1 MENOPAUSAL SYMPTOM: ICD-10-CM

## 2023-11-03 PROCEDURE — 77080 DXA BONE DENSITY AXIAL: CPT

## 2023-12-05 NOTE — TELEPHONE ENCOUNTER
Last Visit: 8/25/20 with MD Christina Krishnan  Next Appointment: 2/9/21 with MD Christina Krishnan  Previous Refill Encounter(s): 2/3/20 #4 with 11 refill    Requested Prescriptions     Pending Prescriptions Disp Refills    ergocalciferol (ERGOCALCIFEROL) 1,250 mcg (50,000 unit) capsule 12 Cap 3     Sig: Take 1 Cap by mouth every seven (7) days.
no nausea/no vomiting/no change in bowel habits/no abdominal pain/no melena/no hematochezia

## 2024-07-22 ENCOUNTER — TRANSCRIBE ORDERS (OUTPATIENT)
Facility: HOSPITAL | Age: 59
End: 2024-07-22

## 2024-07-22 DIAGNOSIS — Z12.31 VISIT FOR SCREENING MAMMOGRAM: Primary | ICD-10-CM

## 2024-09-17 ENCOUNTER — HOSPITAL ENCOUNTER (OUTPATIENT)
Facility: HOSPITAL | Age: 59
Discharge: HOME | End: 2024-09-20
Payer: COMMERCIAL

## 2024-09-17 VITALS — WEIGHT: 162 LBS | BODY MASS INDEX: 30.58 KG/M2 | HEIGHT: 61 IN

## 2024-09-17 DIAGNOSIS — Z12.31 VISIT FOR SCREENING MAMMOGRAM: ICD-10-CM

## 2024-09-17 PROCEDURE — 77063 BREAST TOMOSYNTHESIS BI: CPT

## (undated) DEVICE — CATH IV SAFE STR 22GX1IN BLU -- PROTECTIV PLUS

## (undated) DEVICE — MEDI-VAC NON-CONDUCTIVE SUCTION TUBING: Brand: CARDINAL HEALTH

## (undated) DEVICE — Device

## (undated) DEVICE — FLEX ADVANTAGE 1500CC: Brand: FLEX ADVANTAGE

## (undated) DEVICE — AIRLIFE™ NASAL OXYGEN CANNULA CURVED, NONFLARED TIP WITH 14 FOOT (4.3 M) CRUSH-RESISTANT TUBING, OVER-THE-EAR STYLE: Brand: AIRLIFE™